# Patient Record
Sex: FEMALE | Race: WHITE | Employment: FULL TIME | URBAN - METROPOLITAN AREA
[De-identification: names, ages, dates, MRNs, and addresses within clinical notes are randomized per-mention and may not be internally consistent; named-entity substitution may affect disease eponyms.]

---

## 2017-04-03 ENCOUNTER — APPOINTMENT (OUTPATIENT)
Dept: CT IMAGING | Facility: CLINIC | Age: 52
End: 2017-04-03
Attending: EMERGENCY MEDICINE
Payer: COMMERCIAL

## 2017-04-03 ENCOUNTER — HOSPITAL ENCOUNTER (EMERGENCY)
Facility: CLINIC | Age: 52
Discharge: HOME | End: 2017-04-04
Attending: EMERGENCY MEDICINE | Admitting: EMERGENCY MEDICINE
Payer: COMMERCIAL

## 2017-04-03 ENCOUNTER — OFFICE VISIT (OUTPATIENT)
Dept: URGENT CARE | Facility: URGENT CARE | Age: 52
End: 2017-04-03
Payer: COMMERCIAL

## 2017-04-03 VITALS
BODY MASS INDEX: 32.25 KG/M2 | HEART RATE: 80 BPM | WEIGHT: 182 LBS | TEMPERATURE: 98.3 F | OXYGEN SATURATION: 96 % | HEIGHT: 63 IN | DIASTOLIC BLOOD PRESSURE: 61 MMHG | SYSTOLIC BLOOD PRESSURE: 98 MMHG

## 2017-04-03 DIAGNOSIS — R10.31 ABDOMINAL PAIN, RIGHT LOWER QUADRANT: Primary | ICD-10-CM

## 2017-04-03 DIAGNOSIS — N10 ACUTE PYELONEPHRITIS: ICD-10-CM

## 2017-04-03 LAB
ALBUMIN SERPL-MCNC: 3.4 G/DL (ref 3.4–5)
ALBUMIN UR-MCNC: 30 MG/DL
ALP SERPL-CCNC: 100 U/L (ref 40–150)
ALT SERPL W P-5'-P-CCNC: 39 U/L (ref 0–50)
ANION GAP SERPL CALCULATED.3IONS-SCNC: 8 MMOL/L (ref 3–14)
APPEARANCE UR: ABNORMAL
AST SERPL W P-5'-P-CCNC: 36 U/L (ref 0–45)
BACTERIA #/AREA URNS HPF: ABNORMAL /HPF
BASOPHILS # BLD AUTO: 0 10E9/L (ref 0–0.2)
BASOPHILS NFR BLD AUTO: 0.2 %
BILIRUB SERPL-MCNC: 0.5 MG/DL (ref 0.2–1.3)
BILIRUB UR QL STRIP: NEGATIVE
BUN SERPL-MCNC: 13 MG/DL (ref 7–30)
CALCIUM SERPL-MCNC: 8.7 MG/DL (ref 8.5–10.1)
CHLORIDE SERPL-SCNC: 105 MMOL/L (ref 94–109)
CO2 SERPL-SCNC: 28 MMOL/L (ref 20–32)
COLOR UR AUTO: YELLOW
CREAT SERPL-MCNC: 0.81 MG/DL (ref 0.52–1.04)
DIFFERENTIAL METHOD BLD: ABNORMAL
EOSINOPHIL # BLD AUTO: 0.2 10E9/L (ref 0–0.7)
EOSINOPHIL NFR BLD AUTO: 1.9 %
ERYTHROCYTE [DISTWIDTH] IN BLOOD BY AUTOMATED COUNT: 14.4 % (ref 10–15)
GFR SERPL CREATININE-BSD FRML MDRD: 74 ML/MIN/1.7M2
GLUCOSE SERPL-MCNC: 91 MG/DL (ref 70–99)
GLUCOSE UR STRIP-MCNC: NEGATIVE MG/DL
HCT VFR BLD AUTO: 36.8 % (ref 35–47)
HGB BLD-MCNC: 12 G/DL (ref 11.7–15.7)
HGB UR QL STRIP: ABNORMAL
IMM GRANULOCYTES # BLD: 0 10E9/L (ref 0–0.4)
IMM GRANULOCYTES NFR BLD: 0.2 %
KETONES UR STRIP-MCNC: NEGATIVE MG/DL
LEUKOCYTE ESTERASE UR QL STRIP: ABNORMAL
LIPASE SERPL-CCNC: 131 U/L (ref 73–393)
LYMPHOCYTES # BLD AUTO: 1.8 10E9/L (ref 0.8–5.3)
LYMPHOCYTES NFR BLD AUTO: 14.8 %
MCH RBC QN AUTO: 29 PG (ref 26.5–33)
MCHC RBC AUTO-ENTMCNC: 32.6 G/DL (ref 31.5–36.5)
MCV RBC AUTO: 89 FL (ref 78–100)
MONOCYTES # BLD AUTO: 1.1 10E9/L (ref 0–1.3)
MONOCYTES NFR BLD AUTO: 9.1 %
NEUTROPHILS # BLD AUTO: 8.9 10E9/L (ref 1.6–8.3)
NEUTROPHILS NFR BLD AUTO: 73.8 %
NITRATE UR QL: NEGATIVE
NON-SQ EPI CELLS #/AREA URNS LPF: ABNORMAL /LPF
NRBC # BLD AUTO: 0 10*3/UL
NRBC BLD AUTO-RTO: 0 /100
PH UR STRIP: 5.5 PH (ref 5–7)
PLATELET # BLD AUTO: 254 10E9/L (ref 150–450)
POTASSIUM SERPL-SCNC: 3.6 MMOL/L (ref 3.4–5.3)
PROT SERPL-MCNC: 7.9 G/DL (ref 6.8–8.8)
RBC # BLD AUTO: 4.14 10E12/L (ref 3.8–5.2)
RBC #/AREA URNS AUTO: ABNORMAL /HPF (ref 0–2)
SODIUM SERPL-SCNC: 141 MMOL/L (ref 133–144)
SP GR UR STRIP: 1.02 (ref 1–1.03)
URN SPEC COLLECT METH UR: ABNORMAL
UROBILINOGEN UR STRIP-ACNC: 0.2 EU/DL (ref 0.2–1)
WBC # BLD AUTO: 12.1 10E9/L (ref 4–11)
WBC #/AREA URNS AUTO: ABNORMAL /HPF (ref 0–2)

## 2017-04-03 PROCEDURE — 81001 URINALYSIS AUTO W/SCOPE: CPT | Performed by: EMERGENCY MEDICINE

## 2017-04-03 PROCEDURE — 87086 URINE CULTURE/COLONY COUNT: CPT | Performed by: EMERGENCY MEDICINE

## 2017-04-03 PROCEDURE — 80053 COMPREHEN METABOLIC PANEL: CPT | Performed by: EMERGENCY MEDICINE

## 2017-04-03 PROCEDURE — 85025 COMPLETE CBC W/AUTO DIFF WBC: CPT | Performed by: EMERGENCY MEDICINE

## 2017-04-03 PROCEDURE — 25500064 ZZH RX 255 OP 636: Performed by: EMERGENCY MEDICINE

## 2017-04-03 PROCEDURE — 96374 THER/PROPH/DIAG INJ IV PUSH: CPT | Mod: 59

## 2017-04-03 PROCEDURE — 25000125 ZZHC RX 250: Performed by: EMERGENCY MEDICINE

## 2017-04-03 PROCEDURE — 96361 HYDRATE IV INFUSION ADD-ON: CPT

## 2017-04-03 PROCEDURE — 74177 CT ABD & PELVIS W/CONTRAST: CPT

## 2017-04-03 PROCEDURE — 25000128 H RX IP 250 OP 636: Performed by: EMERGENCY MEDICINE

## 2017-04-03 PROCEDURE — 87186 SC STD MICRODIL/AGAR DIL: CPT | Performed by: EMERGENCY MEDICINE

## 2017-04-03 PROCEDURE — 83690 ASSAY OF LIPASE: CPT | Performed by: EMERGENCY MEDICINE

## 2017-04-03 PROCEDURE — 99285 EMERGENCY DEPT VISIT HI MDM: CPT | Mod: 25

## 2017-04-03 PROCEDURE — 99207 ZZC OFFICE-HOSPITAL ADMIT: CPT | Performed by: NURSE PRACTITIONER

## 2017-04-03 PROCEDURE — 87088 URINE BACTERIA CULTURE: CPT | Performed by: EMERGENCY MEDICINE

## 2017-04-03 RX ORDER — IOPAMIDOL 755 MG/ML
92 INJECTION, SOLUTION INTRAVASCULAR ONCE
Status: COMPLETED | OUTPATIENT
Start: 2017-04-03 | End: 2017-04-03

## 2017-04-03 RX ORDER — SODIUM CHLORIDE 9 MG/ML
1000 INJECTION, SOLUTION INTRAVENOUS CONTINUOUS
Status: DISCONTINUED | OUTPATIENT
Start: 2017-04-03 | End: 2017-04-04 | Stop reason: HOSPADM

## 2017-04-03 RX ADMIN — SODIUM CHLORIDE 68 ML: 9 INJECTION, SOLUTION INTRAVENOUS at 23:03

## 2017-04-03 RX ADMIN — IOPAMIDOL 92 ML: 755 INJECTION, SOLUTION INTRAVENOUS at 23:02

## 2017-04-03 RX ADMIN — SODIUM CHLORIDE 1000 ML: 9 INJECTION, SOLUTION INTRAVENOUS at 22:41

## 2017-04-03 NOTE — ED AVS SNAPSHOT
Emergency Department    [address] [facility] [address]    Phone:  [phone]    Fax:  [phone]                                       Mary Anne Tavarez   MRN: [id number]    Department:   Emergency Department   Date of Visit:  4/3/2017           Patient Information     Date Of Birth          1965        Your diagnoses for this visit were:     Acute pyelonephritis        You were seen by Bindu Ashley MD.      Follow-up Information     Follow up with your primary doctor. Schedule an appointment as soon as possible for a visit in 2 days.        Follow up with  Emergency Department.    Specialty:  EMERGENCY MEDICINE    Why:  As needed, If symptoms worsen    Contact information:    [address] [facility] [address] [phone]        Discharge Instructions       Discharge Instructions  Urinary Tract Infection  You have urinary tract infection, or UTI. The urinary tract includes the kidneys (which make urine), ureters (the tubes that carry urine from the kidneys to the bladder), the bladder (which stores urine), and urethra (the tube that carries urine out of the bladder).  Urinary tract infections occur when bacteria travel up the urethra into the bladder. We suspect a UTI based on chemical and microscopic findings in your urine, but if there is a question about your findings, we will do a culture to see if bacteria grow. A urine culture takes several days. You should always follow-up with your primary physician to find out about results of your culture if one was done.   Return to the Emergency Department if:    You have severe back pain.    You are vomiting so that you can t take your medicine, or have signs of dehydration (such as urinating less than 3 times per day).    You have fever over 101.5 degrees F.    You have significant confusion or are very weak, or feel very ill.    Your child seems much more ill, won t wake up, won t respond right, or is crying for a  long time and won t calm down.    Your child is showing signs of dehydration, Signs of dehydration can be:  o Your infant has had no wet diapers in 4-5 hours.  o Your older child has not passed urine in 6-8 hours.  o Your infant or child starts to have dry mouth and lips, or no saliva or tears.    Follow-up with your doctor:     Children under 24 months need to be seen by their regular doctor within one week after a diagnosis of a UTI. It may be necessary to do some imaging tests to look at the child s kidney or bladder.    You should begin to feel better within 24 - 48 hours of starting your antibiotic.  If you do not, you need to be seen again.      Treatment:     You will be treated with an antibiotic to kill the bacteria. We have to make an educated guess as to which antibiotic will work for your infection. In most healthy people, we can guess right almost all of the time. Sometimes a culture is done to show which antibiotics will work. This usually takes 2-3 days. When the culture is done, we may have to contact you to put you on a different antibiotic.    Take a pain medication such as Tylenol  (acetaminophen), Advil  (ibuprofen), Nuprin  (ibuprofen), or Aleve  (naproxen). If you have been given a narcotic such as Vicodin  (hydrocodone with acetaminophen), Percocet  (oxycodone with acetaminophen), or codeine, do not drive for four hours after you have taken it. If the narcotic contains Tylenol  (acetaminophen), do not take Tylenol  with it. All narcotics will cause constipation, so eat a high fiber diet.      Pyridium  (phenazopyridine) or Uristat  (phenazopyridine) is a prescription medication that numbs the bladder to reduce the burning pain of some UTIs.  The same medication is available in a non-prescription version called Azo-Standard  (phenazopyridine), Urodol  (phenazopyridine), or other brand names. This medication will change the color of the urine and tears (usually blue or orange). If you wear  "contacts, do not wear them while taking this medication as they may be stained by the medication.    Antibiotic Warning:     If you have been placed on antibiotics - watch for signs of allergic reaction.  These include rash, lip swelling, difficulty breathing, wheezing, and dizziness.  If you develop any of these symptoms, stop the antibiotic immediately and go to an emergency room or urgent care for evaluation.    Probiotics: If you have been given an antibiotic, you may want to also take a probiotic pill or eat yogurt with live cultures. Probiotics have \"good bacteria\" to help your intestines stay healthy. Studies have shown that probiotics help prevent diarrhea and other intestine problems (including C. diff infection) when you take antibiotics. You can buy these without a prescription in the pharmacy section of the store.   If you were given a prescription for medicine here today, be sure to read all of the information (including the package insert) that comes with your prescription.  This will include important information about the medicine, its side effects, and any warnings that you need to know about.  The pharmacist who fills the prescription can provide more information and answer questions you may have about the medicine.  If you have questions or concerns that the pharmacist cannot address, please call or return to the Emergency Department.   Opioid Medication Information    Pain medications are among the most commonly prescribed medicines, so we are including this information for all our patients. If you did not receive pain medication or get a prescription for pain medicine, you can ignore it.     You may have been given a prescription for an opioid (narcotic) pain medicine and/or have received a pain medicine while here in the Emergency Department. These medicines can make you drowsy or impaired. You must not drive, operate dangerous equipment, or engage in any other dangerous activities while taking " these medications. If you drive while taking these medications, you could be arrested for DUI, or driving under the influence. Do not drink any alcohol while you are taking these medications.     Opioid pain medications can cause addiction. If you have a history of chemical dependency of any type, you are at a higher risk of becoming addicted to pain medications.  Only take these prescribed medications to treat your pain when all other options have been tried. Take it for as short a time and as few doses as possible. Store your pain pills in a secure place, as they are frequently stolen and provide a dangerous opportunity for children or visitors in your house to start abusing these powerful medications. We will not replace any lost or stolen medicine.  As soon as your pain is better, you should flush all your remaining medication.     Many prescription pain medications contain Tylenol  (acetaminophen), including Vicodin , Tylenol #3 , Norco , Lortab , and Percocet .  You should not take any extra pills of Tylenol  if you are using these prescription medications or you can get very sick.  Do not ever take more than 3000 mg of acetaminophen in any 24 hour period.    All opioids tend to cause constipation. Drink plenty of water and eat foods that have a lot of fiber, such as fruits, vegetables, prune juice, apple juice and high fiber cereal.  Take a laxative if you don t move your bowels at least every other day. Miralax , Milk of Magnesia, Colace , or Senna  can be used to keep you regular.      Remember that you can always come back to the Emergency Department if you are not able to see your regular doctor in the amount of time listed above, if you get any new symptoms, or if there is anything that worries you.        24 Hour Appointment Hotline       To make an appointment at any [facility] clinic, call [phone] ([phone]). If you don't have a family doctor or clinic, we will help you find one. [facility]  clinics are conveniently located to serve the needs of you and your family.             Review of your medicines      START taking        Dose / Directions Last dose taken    ciprofloxacin 500 MG tablet   Commonly known as:  CIPRO   Dose:  500 mg   Quantity:  14 tablet        Take 1 tablet (500 mg) by mouth 2 times daily for 7 days   Refills:  0          Our records show that you are taking the medicines listed below. If these are incorrect, please call your family doctor or clinic.        Dose / Directions Last dose taken    ALEVE PO        Refills:  0        OMEPRAZOLE PO   Dose:  20 mg        Take 20 mg by mouth   Refills:  0                Prescriptions were sent or printed at these locations (1 Prescription)                   Other Prescriptions                Printed at Department/Unit printer (1 of 1)         ciprofloxacin (CIPRO) 500 MG tablet                Procedures and tests performed during your visit     Abdomen US, limited (RUQ only)    CBC with platelets differential    CT Abdomen Pelvis w Contrast    Comprehensive metabolic panel    Lipase    UA reflex to Microscopic and Culture    UA with Microscopic    Urine Culture    Urine Culture Aerobic Bacterial    Urine Microscopic      Orders Needing Specimen Collection     None      Pending Results     Date and Time Order Name Status Description    4/3/2017 2342 Urine Culture In process     4/3/2017 2342 Abdomen US, limited (RUQ only) Preliminary     4/3/2017 2237 Urine Culture Aerobic Bacterial In process     4/3/2017 2227 CT Abdomen Pelvis w Contrast Preliminary             Pending Culture Results     Date and Time Order Name Status Description    4/3/2017 2342 Urine Culture In process     4/3/2017 2237 Urine Culture Aerobic Bacterial In process              Test Results from your hospital stay     4/3/2017 10:55 PM - Interface, Flexilab Results      Component Results     Component Value Ref Range & Units Status    WBC 12.1 (H) 4.0 - 11.0 10e9/L Final     RBC Count 4.14 3.8 - 5.2 10e12/L Final    Hemoglobin 12.0 11.7 - 15.7 g/dL Final    Hematocrit 36.8 35.0 - 47.0 % Final    MCV 89 78 - 100 fl Final    MCH 29.0 26.5 - 33.0 pg Final    MCHC 32.6 31.5 - 36.5 g/dL Final    RDW 14.4 10.0 - 15.0 % Final    Platelet Count 254 150 - 450 10e9/L Final    Diff Method Automated Method  Final    % Neutrophils 73.8 % Final    % Lymphocytes 14.8 % Final    % Monocytes 9.1 % Final    % Eosinophils 1.9 % Final    % Basophils 0.2 % Final    % Immature Granulocytes 0.2 % Final    Nucleated RBCs 0 0 /100 Final    Absolute Neutrophil 8.9 (H) 1.6 - 8.3 10e9/L Final    Absolute Lymphocytes 1.8 0.8 - 5.3 10e9/L Final    Absolute Monocytes 1.1 0.0 - 1.3 10e9/L Final    Absolute Eosinophils 0.2 0.0 - 0.7 10e9/L Final    Absolute Basophils 0.0 0.0 - 0.2 10e9/L Final    Abs Immature Granulocytes 0.0 0 - 0.4 10e9/L Final    Absolute Nucleated RBC 0.0  Final         4/3/2017 11:22 PM - Interface, Flexilab Results      Component Results     Component Value Ref Range & Units Status    Sodium 141 133 - 144 mmol/L Final    Potassium 3.6 3.4 - 5.3 mmol/L Final    Chloride 105 94 - 109 mmol/L Final    Carbon Dioxide 28 20 - 32 mmol/L Final    Anion Gap 8 3 - 14 mmol/L Final    Glucose 91 70 - 99 mg/dL Final    Urea Nitrogen 13 7 - 30 mg/dL Final    Creatinine 0.81 0.52 - 1.04 mg/dL Final    GFR Estimate 74 >60 mL/min/1.7m2 Final    Non  GFR Calc    GFR Estimate If Black 89 >60 mL/min/1.7m2 Final    African American GFR Calc    Calcium 8.7 8.5 - 10.1 mg/dL Final    Bilirubin Total 0.5 0.2 - 1.3 mg/dL Final    Albumin 3.4 3.4 - 5.0 g/dL Final    Protein Total 7.9 6.8 - 8.8 g/dL Final    Alkaline Phosphatase 100 40 - 150 U/L Final    ALT 39 0 - 50 U/L Final    AST 36 0 - 45 U/L Final         4/3/2017 11:20 PM - Interface, Flexilab Results      Component Results     Component Value Ref Range & Units Status    Lipase 131 73 - 393 U/L Final         4/3/2017 11:41 PM - Interface, Radiant Ib       Narrative     CT ABDOMEN PELVIS W CONTRAST  4/3/2017 11:06 PM      HISTORY: Right lower quadrant pain.    TECHNIQUE: CT abdomen and pelvis with intravenous contrast. Radiation  dose for this scan was reduced using automated exposure control,  adjustment of the mA and/or kV according to patient size, or iterative  reconstruction technique. 92 mL Isovue-370.     COMPARISON: None.    FINDINGS:  Abdomen: There is mild dependent atelectasis at the lung bases. The  heart size is normal. Bilobar breast prostheses. The liver, spleen,  gallbladder, pancreas, adrenal glands and left kidney are normal in  appearance. There are subtle areas of decreased enhancement in the mid  and lower pole of the right kidney. Mild inflammation of the adjacent  fat. Subtle renal mass is not completely ruled out. There is no  abdominal or pelvic lymph node enlargement.    Pelvis: The uterus and adnexa appear normal. There is no bowel  obstruction or inflammation. The appendix is normal. Small amount of  free fluid in the pelvis. No free intraperitoneal gas.        Impression     IMPRESSION:  1. Heterogeneous enhancement in the mid and lower pole of the right  kidney with mild surrounding inflammation. This may be acute  pyelonephritis. Subtle renal mass is not completely ruled out and  follow-up is recommended.  2. No appendicitis or other bowel inflammation or obstruction.  3. Small amount of free fluid in the pelvis, within physiologic  limits.         4/3/2017 10:55 PM - Interface, Flexilab Results      Component Results     Component Value Ref Range & Units Status    Color Urine Yellow  Final    Appearance Urine Cloudy  Final    Glucose Urine Negative NEG mg/dL Final    Bilirubin Urine Negative NEG Final    Ketones Urine Negative NEG mg/dL Final    Specific Gravity Urine 1.025 1.003 - 1.035 Final    Blood Urine Large (A) NEG Final    pH Urine 5.5 5.0 - 7.0 pH Final    Protein Albumin Urine 30 (A) NEG mg/dL Final    Urobilinogen Urine  0.2 0.2 - 1.0 EU/dL Final    Nitrite Urine Negative NEG Final    Leukocyte Esterase Urine Moderate (A) NEG Final    Source Midstream Urine  Final         4/3/2017 10:55 PM - Interface, Flexilab Results      Component Results     Component Value Ref Range & Units Status    WBC Urine 25-50 (A) 0 - 2 /HPF Final    RBC Urine 2-5 (A) 0 - 2 /HPF Final    Squamous Epithelial /LPF Urine Many (A) FEW /LPF Final    Bacteria Urine Moderate (A) NEG /HPF Final         4/3/2017 10:56 PM - Interface, Flexilab Results         4/4/2017 12:51 AM - Interface, Radiant Ib      Narrative     US ABDOMEN LIMITED  4/4/2017 12:43 AM      HISTORY: Pain.    COMPARISON: CT 4/3/2017.    FINDINGS: The liver is normal in size and texture without focal mass.  There is no intra or extrahepatic biliary dilatation. The common  hepatic duct measures 0.3 cm.  The gallbladder is normal in appearance  without gallstones.  The pancreas head and body appear normal. The  tail is obscured by bowel gas.  The right kidney measures 12.6 cm.  There is heterogeneous appearance of the right kidney with an  ill-defined mass or lobulation in the midkidney measuring  approximately 4.1 x 2.0 x 2.0 cm. No hydronephrosis.        Impression     IMPRESSION:  1. Heterogeneous appearance of the mid right kidney. This could be a  mass or prominent cortical lobule. No hydronephrosis.  2. No gallstone or biliary dilatation.         4/4/2017 12:15 AM - Interface, Flexilab Results      Component Results     Component Value Ref Range & Units Status    Color Urine Straw  Final    Appearance Urine Clear  Final    Glucose Urine Negative NEG mg/dL Final    Bilirubin Urine Negative NEG Final    Ketones Urine Negative NEG mg/dL Final    Specific Gravity Urine 1.012 1.003 - 1.035 Final    Blood Urine Moderate (A) NEG Final    pH Urine 5.5 5.0 - 7.0 pH Final    Protein Albumin Urine Negative NEG mg/dL Final    Urobilinogen mg/dL Normal 0.0 - 2.0 mg/dL Final    Nitrite Urine Negative NEG  Final    Leukocyte Esterase Urine Moderate (A) NEG Final    Source Midstream Urine  Final    WBC Urine 10 (H) 0 - 2 /HPF Final    RBC Urine <1 0 - 2 /HPF Final    Bacteria Urine Few (A) NEG /HPF Final    Squamous Epithelial /HPF Urine 1 0 - 1 /HPF Final         4/4/2017 12:08 AM - Interface, Angelab Results                Clinical Quality Measure: Blood Pressure Screening     Your blood pressure was checked while you were in the emergency department today. The last reading we obtained was  BP: 120/52 . Please read the guidelines below about what these numbers mean and what you should do about them.  If your systolic blood pressure (the top number) is less than 120 and your diastolic blood pressure (the bottom number) is less than 80, then your blood pressure is normal. There is nothing more that you need to do about it.  If your systolic blood pressure (the top number) is 120-139 or your diastolic blood pressure (the bottom number) is 80-89, your blood pressure may be higher than it should be. You should have your blood pressure rechecked within a year by a primary care provider.  If your systolic blood pressure (the top number) is 140 or greater or your diastolic blood pressure (the bottom number) is 90 or greater, you may have high blood pressure. High blood pressure is treatable, but if left untreated over time it can put you at risk for heart attack, stroke, or kidney failure. You should have your blood pressure rechecked by a primary care provider within the next 4 weeks.  If your provider in the emergency department today gave you specific instructions to follow-up with your doctor or provider even sooner than that, you should follow that instruction and not wait for up to 4 weeks for your follow-up visit.        Thank you for choosing [facility]       Thank you for choosing [facility] for your care. Our goal is always to provide you with excellent care. Hearing back from our patients is one way we can continue  "to improve our services. Please take a few minutes to complete the written survey that you may receive in the mail after you visit with us. Thank you!        [employer] Information     [employer] lets you send messages to your doctor, view your test results, renew your prescriptions, schedule appointments and more. To sign up, go to [email or web address] . Click on \"Log in\" on the left side of the screen, which will take you to the Welcome page. Then click on \"Sign up Now\" on the right side of the page.     You will be asked to enter the access code listed below, as well as some personal information. Please follow the directions to create your username and password.     Your access code is: [email or web address]  Expires: 2017  9:04 PM     Your access code will  in 90 days. If you need help or a new code, please call your [facility] clinic or [phone].        Care EveryWhere ID     This is your Care EveryWhere ID. This could be used by other organizations to access your [facility] medical records  [id number]        After Visit Summary       This is your record. Keep this with you and show to your community pharmacist(s) and doctor(s) at your next visit.                  "

## 2017-04-03 NOTE — ED AVS SNAPSHOT
Emergency Department    [address] [place] [address]    Phone:  [phone]    Fax:  [phone]                                       Mary Anne Tavarez   MRN: [id number]    Department:   Emergency Department   Date of Visit:  4/3/2017           After Visit Summary Signature Page     I have received my discharge instructions, and my questions have been answered. I have discussed any challenges I see with this plan with the nurse or doctor.    ..........................................................................................................................................  Patient/Patient Representative Signature      ..........................................................................................................................................  Patient Representative Print Name and Relationship to Patient    ..................................................               ................................................  Date                                            Time    ..........................................................................................................................................  Reviewed by Signature/Title    ...................................................              ..............................................  Date                                                            Time

## 2017-04-03 NOTE — MR AVS SNAPSHOT
After Visit Summary   4/3/2017    Mary Anne Tavarez    MRN: [id number]           Patient Information     Date Of Birth          1965        Visit Information        Provider Department      4/3/2017 8:20 PM Tanika Glez APRN [facility]        Today's Diagnoses     Abdominal pain, right lower quadrant    -  1      Care Instructions      Abdominal Pain  Abdominal pain is pain in the stomach or intestinal area. Everyone has this pain from time to time. In many cases it goes away on its own. But abdominal pain can sometimes be due to a serious problem, such as appendicitis. So it s important to know when to seek help.  Causes of abdominal pain  There are many possible causes of abdominal pain. Common causes in adults include:    Constipation, diarrhea, or gas    GERD (gastroesophageal reflux disease) movement of stomach acid into the esophagus, also known as acid reflux or heartburn    Peptic ulcer (a sore in the lining of the stomach or small intestine)    Inflammation of the gallbladder, liver, or pancreas    Gallstones or kidney stones    Appendicitis     Obstruction of the intestines     Hernia (bulging of an internal organ through a muscle or other tissue)    Urinary tract infections    In women, menstrual cramps, fibroids, or endometriosis of the uterus    Inflammation or infection of the intestines  Diagnosing the cause of abdominal pain  Your health care provider will examine you to help find the cause of your pain. If needed, tests will be ordered. Because abdominal pain has so many possible causes, it can be hard to discover the reason for the pain. Giving details about your pain can help. Be ready to tell your health care provider where and when you feel the pain and what makes it better or worse. Also mention whether you have other symptoms such as fever, tiredness, nausea, vomiting, or changes in bathroom habits.  Treating abdominal pain  Certain causes  of pain, such as appendicitis or a bowel obstruction, need emergency treatment. Other problems can be treated with rest, fluids, or medications. Your health care provider can give you specific instructions for treatment or self-care based on the cause of your pain.  If you have vomiting or diarrhea, sip water or other clear fluids. When you are ready to eat solid foods again, start with small amounts of easy-to-digest, low-fat foods, such as applesauce, toast, or crackers.   When to call the doctor  Call 911 or go to the hospital right away if you:    Can t pass stool and are vomiting    Are vomiting blood or have black, tarry diarrhea    Also have chest, neck, or shoulder pain    Feel like you are about to pass out    Have pain in your shoulder blades with nausea    Have sudden, excruciating abdominal pain    Have new, severe pain unlike any you have felt before    Have a belly that is rigid, hard, and tender to touch  Call your doctor if you have:    Pain for more than 5 days    Bloating for more than 2 days    Diarrhea for more than 5 days    Fever of 101 F (38.3 C) or higher    Pain that continues to worsen    Unexplained weight loss    Continued lack of appetite    Blood in the stool  How to prevent abdominal pain  Here are some tips to help prevent abdominal pain:    Eat smaller amounts of food at one time.    Avoid greasy, fried, or other high-fat foods.    Avoid foods that give you gas.    Exercise regularly.    Drink plenty of fluids.  To help prevent symptoms of gastroesophageal reflux disease (GERD):    Quit smoking.    Reduce alcohol and certain foods that increase stomach acid.     Lose excess weight.    Finish eating at least 2 hours before you go to bed or lie down.    Elevate the head of your bed.    9351-7174 [employer]. [address] [place] [address], [place] [address]. All rights reserved. This information is not intended as a substitute for professional medical care. Always follow your  "healthcare professional's instructions.              Follow-ups after your visit        Follow-up notes from your care team     Return if symptoms worsen or fail to improve.      Who to contact     If you have questions or need follow up information about today's clinic visit or your schedule please contact [facility] directly at [phone].  Normal or non-critical lab and imaging results will be communicated to you by MyChart, letter or phone within 4 business days after the clinic has received the results. If you do not hear from us within 7 days, please contact the clinic through [employer] or phone. If you have a critical or abnormal lab result, we will notify you by phone as soon as possible.  Submit refill requests through [employer] or call your pharmacy and they will forward the refill request to us. Please allow 3 business days for your refill to be completed.          Additional Information About Your Visit        MyChart Information     [employer] lets you send messages to your doctor, view your test results, renew your prescriptions, schedule appointments and more. To sign up, go to [email or web address] . Click on \"Log in\" on the left side of the screen, which will take you to the Welcome page. Then click on \"Sign up Now\" on the right side of the page.     You will be asked to enter the access code listed below, as well as some personal information. Please follow the directions to create your username and password.     Your access code is: [email or web address]  Expires: 2017  9:04 PM     Your access code will  in 90 days. If you need help or a new code, please call your [facility] clinic or [phone].        Care EveryWhere ID     This is your Care EveryWhere ID. This could be used by other organizations to access your [facility] medical records  [id number]        Your Vitals Were     Pulse Temperature Height Pulse Oximetry BMI (Body Mass Index)       80 98.3  F (36.8  C) (Oral) 5' " "3\" (1.6 m) 96% 32.24 kg/m2        Blood Pressure from Last 3 Encounters:   04/03/17 98/61    Weight from Last 3 Encounters:   04/03/17 182 lb (82.6 kg)              Today, you had the following     No orders found for display       Primary Care Provider    None Specified       No primary provider on file.        Thank you!     Thank you for choosing [facility]  for your care. Our goal is always to provide you with excellent care. Hearing back from our patients is one way we can continue to improve our services. Please take a few minutes to complete the written survey that you may receive in the mail after your visit with us. Thank you!             Your Updated Medication List - Protect others around you: Learn how to safely use, store and throw away your medicines at www.disposemymeds.org.          This list is accurate as of: 4/3/17  9:06 PM.  Always use your most recent med list.                   Brand Name Dispense Instructions for use    ALEVE PO            "

## 2017-04-04 ENCOUNTER — APPOINTMENT (OUTPATIENT)
Dept: ULTRASOUND IMAGING | Facility: CLINIC | Age: 52
End: 2017-04-04
Attending: EMERGENCY MEDICINE
Payer: COMMERCIAL

## 2017-04-04 ENCOUNTER — TELEPHONE (OUTPATIENT)
Dept: NURSING | Facility: CLINIC | Age: 52
End: 2017-04-04

## 2017-04-04 ENCOUNTER — HOSPITAL ENCOUNTER (OUTPATIENT)
Facility: CLINIC | Age: 52
Setting detail: OBSERVATION
Discharge: HOME | End: 2017-04-05
Attending: EMERGENCY MEDICINE | Admitting: INTERNAL MEDICINE
Payer: COMMERCIAL

## 2017-04-04 VITALS
HEIGHT: 63 IN | TEMPERATURE: 98.2 F | HEART RATE: 80 BPM | OXYGEN SATURATION: 96 % | RESPIRATION RATE: 16 BRPM | SYSTOLIC BLOOD PRESSURE: 107 MMHG | DIASTOLIC BLOOD PRESSURE: 53 MMHG | BODY MASS INDEX: 32.25 KG/M2 | WEIGHT: 182 LBS

## 2017-04-04 DIAGNOSIS — N10 ACUTE PYELONEPHRITIS: ICD-10-CM

## 2017-04-04 PROBLEM — N12 PYELONEPHRITIS: Status: ACTIVE | Noted: 2017-04-04

## 2017-04-04 LAB
ALBUMIN UR-MCNC: NEGATIVE MG/DL
ANION GAP SERPL CALCULATED.3IONS-SCNC: 10 MMOL/L (ref 3–14)
APPEARANCE UR: CLEAR
BACTERIA #/AREA URNS HPF: ABNORMAL /HPF
BASOPHILS # BLD AUTO: 0 10E9/L (ref 0–0.2)
BASOPHILS NFR BLD AUTO: 0.3 %
BILIRUB UR QL STRIP: NEGATIVE
BUN SERPL-MCNC: 11 MG/DL (ref 7–30)
CALCIUM SERPL-MCNC: 8.4 MG/DL (ref 8.5–10.1)
CHLORIDE SERPL-SCNC: 104 MMOL/L (ref 94–109)
CO2 BLDCOV-SCNC: 25 MMOL/L (ref 21–28)
CO2 SERPL-SCNC: 26 MMOL/L (ref 20–32)
COLOR UR AUTO: ABNORMAL
CREAT SERPL-MCNC: 0.74 MG/DL (ref 0.52–1.04)
DIFFERENTIAL METHOD BLD: ABNORMAL
EOSINOPHIL # BLD AUTO: 0.2 10E9/L (ref 0–0.7)
EOSINOPHIL NFR BLD AUTO: 1.8 %
ERYTHROCYTE [DISTWIDTH] IN BLOOD BY AUTOMATED COUNT: 14.2 % (ref 10–15)
GFR SERPL CREATININE-BSD FRML MDRD: 82 ML/MIN/1.7M2
GLUCOSE SERPL-MCNC: 96 MG/DL (ref 70–99)
GLUCOSE UR STRIP-MCNC: NEGATIVE MG/DL
HCT VFR BLD AUTO: 33.7 % (ref 35–47)
HGB BLD-MCNC: 10.9 G/DL (ref 11.7–15.7)
HGB UR QL STRIP: ABNORMAL
IMM GRANULOCYTES # BLD: 0 10E9/L (ref 0–0.4)
IMM GRANULOCYTES NFR BLD: 0.2 %
KETONES UR STRIP-MCNC: NEGATIVE MG/DL
LACTATE BLD-SCNC: 0.6 MMOL/L (ref 0.7–2.1)
LEUKOCYTE ESTERASE UR QL STRIP: ABNORMAL
LYMPHOCYTES # BLD AUTO: 1.7 10E9/L (ref 0.8–5.3)
LYMPHOCYTES NFR BLD AUTO: 14.3 %
MCH RBC QN AUTO: 28.5 PG (ref 26.5–33)
MCHC RBC AUTO-ENTMCNC: 32.3 G/DL (ref 31.5–36.5)
MCV RBC AUTO: 88 FL (ref 78–100)
MONOCYTES # BLD AUTO: 1.6 10E9/L (ref 0–1.3)
MONOCYTES NFR BLD AUTO: 13.3 %
NEUTROPHILS # BLD AUTO: 8.4 10E9/L (ref 1.6–8.3)
NEUTROPHILS NFR BLD AUTO: 70.1 %
NITRATE UR QL: NEGATIVE
NRBC # BLD AUTO: 0 10*3/UL
NRBC BLD AUTO-RTO: 0 /100
PCO2 BLDV: 43 MM HG (ref 40–50)
PH BLDV: 7.38 PH (ref 7.32–7.43)
PH UR STRIP: 5.5 PH (ref 5–7)
PLATELET # BLD AUTO: 262 10E9/L (ref 150–450)
PO2 BLDV: 38 MM HG (ref 25–47)
POTASSIUM SERPL-SCNC: 3.8 MMOL/L (ref 3.4–5.3)
RBC # BLD AUTO: 3.83 10E12/L (ref 3.8–5.2)
RBC #/AREA URNS AUTO: <1 /HPF (ref 0–2)
SAO2 % BLDV FROM PO2: 71 %
SODIUM SERPL-SCNC: 140 MMOL/L (ref 133–144)
SP GR UR STRIP: 1.01 (ref 1–1.03)
SQUAMOUS #/AREA URNS AUTO: 1 /HPF (ref 0–1)
URN SPEC COLLECT METH UR: ABNORMAL
UROBILINOGEN UR STRIP-MCNC: NORMAL MG/DL (ref 0–2)
WBC # BLD AUTO: 12 10E9/L (ref 4–11)
WBC #/AREA URNS AUTO: 10 /HPF (ref 0–2)

## 2017-04-04 PROCEDURE — G0378 HOSPITAL OBSERVATION PER HR: HCPCS

## 2017-04-04 PROCEDURE — 25000132 ZZH RX MED GY IP 250 OP 250 PS 637: Performed by: INTERNAL MEDICINE

## 2017-04-04 PROCEDURE — 25000128 H RX IP 250 OP 636: Performed by: EMERGENCY MEDICINE

## 2017-04-04 PROCEDURE — 80048 BASIC METABOLIC PNL TOTAL CA: CPT | Performed by: EMERGENCY MEDICINE

## 2017-04-04 PROCEDURE — 83605 ASSAY OF LACTIC ACID: CPT

## 2017-04-04 PROCEDURE — 87088 URINE BACTERIA CULTURE: CPT | Performed by: EMERGENCY MEDICINE

## 2017-04-04 PROCEDURE — 81001 URINALYSIS AUTO W/SCOPE: CPT | Performed by: EMERGENCY MEDICINE

## 2017-04-04 PROCEDURE — 96361 HYDRATE IV INFUSION ADD-ON: CPT

## 2017-04-04 PROCEDURE — 96375 TX/PRO/DX INJ NEW DRUG ADDON: CPT

## 2017-04-04 PROCEDURE — 82803 BLOOD GASES ANY COMBINATION: CPT

## 2017-04-04 PROCEDURE — 76705 ECHO EXAM OF ABDOMEN: CPT

## 2017-04-04 PROCEDURE — 96365 THER/PROPH/DIAG IV INF INIT: CPT

## 2017-04-04 PROCEDURE — 99219 ZZC INITIAL OBSERVATION CARE,LEVL II: CPT | Performed by: INTERNAL MEDICINE

## 2017-04-04 PROCEDURE — 85025 COMPLETE CBC W/AUTO DIFF WBC: CPT | Performed by: EMERGENCY MEDICINE

## 2017-04-04 PROCEDURE — 87086 URINE CULTURE/COLONY COUNT: CPT | Performed by: EMERGENCY MEDICINE

## 2017-04-04 PROCEDURE — 25000132 ZZH RX MED GY IP 250 OP 250 PS 637: Performed by: EMERGENCY MEDICINE

## 2017-04-04 PROCEDURE — 87186 SC STD MICRODIL/AGAR DIL: CPT | Performed by: EMERGENCY MEDICINE

## 2017-04-04 PROCEDURE — 99285 EMERGENCY DEPT VISIT HI MDM: CPT | Mod: 25

## 2017-04-04 RX ORDER — NALOXONE HYDROCHLORIDE 0.4 MG/ML
.1-.4 INJECTION, SOLUTION INTRAMUSCULAR; INTRAVENOUS; SUBCUTANEOUS
Status: DISCONTINUED | OUTPATIENT
Start: 2017-04-04 | End: 2017-04-05 | Stop reason: HOSPADM

## 2017-04-04 RX ORDER — FLUOROURACIL 50 MG/G
CREAM TOPICAL WEEKLY
COMMUNITY

## 2017-04-04 RX ORDER — CIPROFLOXACIN 500 MG/1
500 TABLET, FILM COATED ORAL 2 TIMES DAILY
Status: DISCONTINUED | OUTPATIENT
Start: 2017-04-04 | End: 2017-04-05 | Stop reason: HOSPADM

## 2017-04-04 RX ORDER — ONDANSETRON 2 MG/ML
4 INJECTION INTRAMUSCULAR; INTRAVENOUS EVERY 6 HOURS PRN
Status: DISCONTINUED | OUTPATIENT
Start: 2017-04-04 | End: 2017-04-05 | Stop reason: HOSPADM

## 2017-04-04 RX ORDER — ACETAMINOPHEN 325 MG/1
650 TABLET ORAL ONCE
Status: COMPLETED | OUTPATIENT
Start: 2017-04-04 | End: 2017-04-04

## 2017-04-04 RX ORDER — ACETAMINOPHEN 325 MG/1
650 TABLET ORAL EVERY 4 HOURS PRN
Status: DISCONTINUED | OUTPATIENT
Start: 2017-04-04 | End: 2017-04-05 | Stop reason: HOSPADM

## 2017-04-04 RX ORDER — CIPROFLOXACIN 500 MG/1
500 TABLET, FILM COATED ORAL 2 TIMES DAILY
Qty: 14 TABLET | Refills: 0 | Status: SHIPPED | OUTPATIENT
Start: 2017-04-04 | End: 2017-04-11

## 2017-04-04 RX ORDER — ONDANSETRON 4 MG/1
4 TABLET, ORALLY DISINTEGRATING ORAL EVERY 6 HOURS PRN
Status: DISCONTINUED | OUTPATIENT
Start: 2017-04-04 | End: 2017-04-05 | Stop reason: HOSPADM

## 2017-04-04 RX ORDER — OXYCODONE AND ACETAMINOPHEN 5; 325 MG/1; MG/1
1 TABLET ORAL EVERY 4 HOURS PRN
Status: DISCONTINUED | OUTPATIENT
Start: 2017-04-04 | End: 2017-04-05 | Stop reason: HOSPADM

## 2017-04-04 RX ORDER — SODIUM CHLORIDE 9 MG/ML
1000 INJECTION, SOLUTION INTRAVENOUS CONTINUOUS
Status: DISCONTINUED | OUTPATIENT
Start: 2017-04-04 | End: 2017-04-05 | Stop reason: HOSPADM

## 2017-04-04 RX ORDER — MORPHINE SULFATE 4 MG/ML
4 INJECTION, SOLUTION INTRAMUSCULAR; INTRAVENOUS ONCE
Status: COMPLETED | OUTPATIENT
Start: 2017-04-04 | End: 2017-04-04

## 2017-04-04 RX ORDER — IBUPROFEN 600 MG/1
600 TABLET, FILM COATED ORAL EVERY 6 HOURS PRN
Status: DISCONTINUED | OUTPATIENT
Start: 2017-04-04 | End: 2017-04-05 | Stop reason: HOSPADM

## 2017-04-04 RX ORDER — CEFTRIAXONE 1 G/1
1 INJECTION, POWDER, FOR SOLUTION INTRAMUSCULAR; INTRAVENOUS ONCE
Status: COMPLETED | OUTPATIENT
Start: 2017-04-04 | End: 2017-04-04

## 2017-04-04 RX ORDER — CIPROFLOXACIN 500 MG/1
500 TABLET, FILM COATED ORAL ONCE
Status: COMPLETED | OUTPATIENT
Start: 2017-04-04 | End: 2017-04-04

## 2017-04-04 RX ORDER — LIDOCAINE 40 MG/G
CREAM TOPICAL
Status: DISCONTINUED | OUTPATIENT
Start: 2017-04-04 | End: 2017-04-05 | Stop reason: HOSPADM

## 2017-04-04 RX ORDER — PROCHLORPERAZINE 25 MG
25 SUPPOSITORY, RECTAL RECTAL EVERY 12 HOURS PRN
Status: DISCONTINUED | OUTPATIENT
Start: 2017-04-04 | End: 2017-04-05 | Stop reason: HOSPADM

## 2017-04-04 RX ORDER — PROCHLORPERAZINE MALEATE 5 MG
5-10 TABLET ORAL EVERY 6 HOURS PRN
Status: DISCONTINUED | OUTPATIENT
Start: 2017-04-04 | End: 2017-04-05 | Stop reason: HOSPADM

## 2017-04-04 RX ORDER — KETOROLAC TROMETHAMINE 30 MG/ML
30 INJECTION, SOLUTION INTRAMUSCULAR; INTRAVENOUS ONCE
Status: COMPLETED | OUTPATIENT
Start: 2017-04-04 | End: 2017-04-04

## 2017-04-04 RX ADMIN — CIPROFLOXACIN HYDROCHLORIDE 500 MG: 500 TABLET, FILM COATED ORAL at 21:28

## 2017-04-04 RX ADMIN — SODIUM CHLORIDE 1000 ML: 9 INJECTION, SOLUTION INTRAVENOUS at 18:21

## 2017-04-04 RX ADMIN — SODIUM CHLORIDE 1000 ML: 9 INJECTION, SOLUTION INTRAVENOUS at 20:16

## 2017-04-04 RX ADMIN — KETOROLAC TROMETHAMINE 30 MG: 30 INJECTION, SOLUTION INTRAMUSCULAR at 01:49

## 2017-04-04 RX ADMIN — CEFTRIAXONE 1 G: 1 INJECTION, POWDER, FOR SOLUTION INTRAMUSCULAR; INTRAVENOUS at 19:21

## 2017-04-04 RX ADMIN — MORPHINE SULFATE 4 MG: 4 INJECTION, SOLUTION INTRAMUSCULAR; INTRAVENOUS at 19:21

## 2017-04-04 RX ADMIN — ACETAMINOPHEN 650 MG: 325 TABLET, FILM COATED ORAL at 19:20

## 2017-04-04 RX ADMIN — CIPROFLOXACIN HYDROCHLORIDE 500 MG: 500 TABLET, FILM COATED ORAL at 01:49

## 2017-04-04 ASSESSMENT — ENCOUNTER SYMPTOMS
DYSURIA: 0
ABDOMINAL PAIN: 1
FEVER: 0
COUGH: 0
CHILLS: 1
DIZZINESS: 1
DIARRHEA: 1
FEVER: 1
ABDOMINAL PAIN: 1
FREQUENCY: 0
CHILLS: 1

## 2017-04-04 NOTE — NURSING NOTE
"Mary Anne Tavarez;   Chief Complaint   Patient presents with     Abdominal Pain     onset saturday, fever, chills, painful with taking a breath, nausea, diarrhea, d/c appetite, pain worse with bringing knees up to abdomen      Urgent Care     Initial BP 98/61 (BP Location: Right arm, Patient Position: Chair, Cuff Size: Adult Large)  Pulse 80  Temp 98.3  F (36.8  C) (Oral)  Ht 5' 3\" (1.6 m)  Wt 182 lb (82.6 kg)  SpO2 96%  BMI 32.24 kg/m2 Estimated body mass index is 32.24 kg/(m^2) as calculated from the following:    Height as of this encounter: 5' 3\" (1.6 m).    Weight as of this encounter: 182 lb (82.6 kg)..  BP completed using cuff size large.  Ashley Guzman R.N.  "

## 2017-04-04 NOTE — PROGRESS NOTES
"SUBJECTIVE:  HPI:  Mary Anne Tavarez is a 52 year old female who presents with the CC of abdominal pain in the RIGHT lower quadrant for days. LMP: 2017, heavier menses, reports irregular cycles due to menopause. Reports taking OTC Aleve every 4 hours with some relief for past 3 days. Reports fever, chills and myalgias for the past 3 days, last Aleve at 4pm today. Report ill contacts at work. Reports feelings of nausea, decreased appetite, no emesis. Does endorse some diarrhea for 4 day. Denies any concerns of STD, with one male partner.     Pain is located in the RLQ area, with radiation to None and umbilicus.  The pain is characterized as sharp, and at worst is a level 6-7 on a scale of 1-10.  Pain has been present for 5 day(s) and is rapidly progressive.  EXACERBATING FACTORS: cough/deep breath and movement/walking  RELIEVING FACTORS: position, \"keeping leg stretched\".  ASSOCIATED SX: nausea, diarrhea, fever, chills, sweats and myalgias.    Reports no abdominal surgeries, 3  with no concerns of uterine masses or ovarian cysts.     No past medical history on file.  Current Outpatient Prescriptions   Medication Sig Dispense Refill     Naproxen Sodium (ALEVE PO)        Social History   Substance Use Topics     Smoking status: Not on file     Smokeless tobacco: Not on file     Alcohol use Not on file     OBJECTIVE:  BP 98/61 (BP Location: Right arm, Patient Position: Chair, Cuff Size: Adult Large)  Pulse 80  Temp 98.3  F (36.8  C) (Oral)  Ht 5' 3\" (1.6 m)  Wt 182 lb (82.6 kg)  SpO2 96%  BMI 32.24 kg/m2  GENERAL APPEARANCE: healthy, alert and no distress  CV: S1, S2 auscultated with regular rate and rhythm, no gallops, murmurs or rubs noted  Lungs: Bilateral lobes clear throughout without wheezes, crackles, or rhonchi noted.   ABDOMEN: soft, with localized tenderness in RLQ with superficial and deep palpation. Voluntary guarding noted of RLQ. Bowel sounds in all four quadrants, hyperactive. No masses " noted.    ASSESSMENT: RLQ pain    PLAN: Discussed with patient need for further evaluation in the ED due to concern for appendicitis versus other acute abdominal processes. Patient agreed to the plan of care, transport via personal care with boyfriend. Report called to [facility] ED, to provider on call, [place].     JOAO Page, CNP

## 2017-04-04 NOTE — ED PROVIDER NOTES
"  History     Chief Complaint:  Fever and Chills     HPI   Mary Anne Tavarez is a 52 year old female who presents to the emergency department for evaluation of fevers and chills.  The patient was evaluated here in the ED yesterday with the below work-up performed where she was diagnosed with acute pyelonephritis.  She was given a dose of Cipro prior to being discharged on the same at 0300 this morning.  The patient took her second dose of Cipro this morning at 0600, however despite this and the use of Ibuprofen 600 mg every four hours has had a progressively worsening fever up to 101.6 degrees at 1800.  After contacting the nurse's line the patient presented to the ED for reevaluation.  She has had associated chills as well as right-sided abdominal with this which is exacerbated with deep inspirations.  The patient denies any urinary symptoms including dysuria or frequency and has had no vaginal bleeding or discharge.  She denies any chest pain or cough, but endorses that when her Ibuprofen wears off her lungs \"feel heavy.\"     Work-up performed on 04/03/2017 at [facility] ED:  Abdomen US, limited (RUQ only):  IMPRESSION:  1. Heterogeneous appearance of the mid right kidney. This could be a mass or prominent cortical lobule. No hydronephrosis.  2. No gallstone or biliary dilatation.  Per radiology     CT Abdomen Pelvis w Contrast  IMPRESSION:  1. Heterogeneous enhancement in the mid and lower pole of the right kidney with mild surrounding inflammation. This may be acute pyelonephritis. Subtle renal mass is not completely ruled out and  follow-up is recommended.  2. No appendicitis or other bowel inflammation or obstruction.  3. Small amount of free fluid in the pelvis, within physiologic limits.  Per radiology.      Laboratory:  CBC: WBC 12.1 (H), HGB 12.0,   CMP: WNL (Creatinine 0.81)  Lipase: 131    UA (2237): Clear yellow urine, Blood Large, Albumin 30, Leukocyte Esterase Moderate, WBC 25-50, RBC 2-5, Squamous " "Epithelial Many, Bacteria Moderate, o/w negative.   UA (0003): Clear straw urine, Blood Moderate, Leukocyte Esterase Moderate, WBC 10 (H), Bacteria Few, o/w negative.   Urine Culture x2: pending    Allergies:  NKDA     Medications:    Cipro   Aleve  Omeprazole      Past Medical History:    GERD    Past Surgical History:    Breast augmentation   Genitourinary surgery     Family History:    Family history of esophageal reflux and pulmonary fibrosis.      Social History:  The patient presented to the ED with her daughter.  Tobacco use: Former smoker  Alcohol use: Yes   Marital Status:  Single      Review of Systems   Constitutional: Positive for chills and fever.   Respiratory: Negative for cough.    Cardiovascular: Negative for chest pain.   Gastrointestinal: Positive for abdominal pain.   Genitourinary: Negative for dysuria, frequency, vaginal bleeding and vaginal discharge.   All other systems reviewed and are negative.      Physical Exam   First Vitals:  BP: 110/42  Pulse: 89  Temp: 100.5  F (38.1  C)  Resp: 18  Height: 160 cm (5' 3\")  Weight: 82.6 kg (182 lb)  SpO2: 95 %      Physical Exam  Physical Exam   General:  Sitting on bed with daughter at bedside.   HENT:  No obvious trauma to head  Right Ear:  External ear normal.   Left Ear:  External ear normal.   Nose:  Nose normal.   Eyes:  Conjunctivae and EOM are normal. Pupils are equal, round, and reactive.   Neck: Normal range of motion. Neck supple. No tracheal deviation present.   CV:  Normal heart sounds. No murmur heard.  Pulm/Chest: Effort normal and breath sounds normal.   Abd: Soft. No distension. There is no tenderness. There is no rigidity, no rebound and no guarding.   M/S: Normal range of motion.   Right CVA tenderness.   Neuro: Alert. GCS 15.  Skin: Skin is warm and dry. No rash noted. Not diaphoretic.   Psych: Normal mood and affect. Behavior is normal.      Emergency Department Course   Laboratory:  1834 ISTAT Gases Lactate: pH 7.38, CO2 43, O2 38, " Bicarbonate 25, O2 Sat 71, Lactic Acid 0.6 low    CBC: WBC 12.0 high, HGB 10.9 low, o/w WNL. ()    BMP: Calcium 8.4 low, o/w WNL. (Creatinine 0.74)     Interventions:  NS 1,000 mL IV  Tylenol 650 mg PO   Morphine 4 mg IV  Rocephin 1 g IV     Emergency Department Course:  Nursing notes and vitals reviewed.   1830: I performed an exam of the patient as documented above.     The above work-up was undertaken.    1903: I spoke with Dr. Chan, of the hospitalist service regarding the patient.      Findings and plan explained to the patient and daughter who consents to observation. Discussed the patient with ?Dr. Chan, who will place the patient in observation in the observation  area.     Impression & Plan    Medical Decision Making:  Mary Anne Tavarez is a very pleasant 52 year old year old patient who presents to the emergency department with concern of right flank pain.  She was seen for this yesterday and diagnosed with pyelonephritis after having a CT and ultrasound down.  The patient continues to have pain.  She had one dose of Cipro this morning, but does not feel comfortable continuing to be treated at home.  She is provided some Morphine and felt somewhat better.  She requested admission.  I spoke to Dr. Chan who has agreed to admit the patient for observation for continued evaluation and treatment.  The patient has a nonsurgical abdomen and I do not believe any imaging needs to be repeated at this time.      Diagnosis:    ICD-10-CM    1. Acute pyelonephritis N10        Disposition:  Admission     Da Khan  4/4/2017    EMERGENCY DEPARTMENT       Da Khan DO  04/04/17 1940

## 2017-04-04 NOTE — PATIENT INSTRUCTIONS
Abdominal Pain  Abdominal pain is pain in the stomach or intestinal area. Everyone has this pain from time to time. In many cases it goes away on its own. But abdominal pain can sometimes be due to a serious problem, such as appendicitis. So it s important to know when to seek help.  Causes of abdominal pain  There are many possible causes of abdominal pain. Common causes in adults include:    Constipation, diarrhea, or gas    GERD (gastroesophageal reflux disease) movement of stomach acid into the esophagus, also known as acid reflux or heartburn    Peptic ulcer (a sore in the lining of the stomach or small intestine)    Inflammation of the gallbladder, liver, or pancreas    Gallstones or kidney stones    Appendicitis     Obstruction of the intestines     Hernia (bulging of an internal organ through a muscle or other tissue)    Urinary tract infections    In women, menstrual cramps, fibroids, or endometriosis of the uterus    Inflammation or infection of the intestines  Diagnosing the cause of abdominal pain  Your health care provider will examine you to help find the cause of your pain. If needed, tests will be ordered. Because abdominal pain has so many possible causes, it can be hard to discover the reason for the pain. Giving details about your pain can help. Be ready to tell your health care provider where and when you feel the pain and what makes it better or worse. Also mention whether you have other symptoms such as fever, tiredness, nausea, vomiting, or changes in bathroom habits.  Treating abdominal pain  Certain causes of pain, such as appendicitis or a bowel obstruction, need emergency treatment. Other problems can be treated with rest, fluids, or medications. Your health care provider can give you specific instructions for treatment or self-care based on the cause of your pain.  If you have vomiting or diarrhea, sip water or other clear fluids. When you are ready to eat solid foods again, start with  small amounts of easy-to-digest, low-fat foods, such as applesauce, toast, or crackers.   When to call the doctor  Call 911 or go to the hospital right away if you:    Can t pass stool and are vomiting    Are vomiting blood or have black, tarry diarrhea    Also have chest, neck, or shoulder pain    Feel like you are about to pass out    Have pain in your shoulder blades with nausea    Have sudden, excruciating abdominal pain    Have new, severe pain unlike any you have felt before    Have a belly that is rigid, hard, and tender to touch  Call your doctor if you have:    Pain for more than 5 days    Bloating for more than 2 days    Diarrhea for more than 5 days    Fever of 101 F (38.3 C) or higher    Pain that continues to worsen    Unexplained weight loss    Continued lack of appetite    Blood in the stool  How to prevent abdominal pain  Here are some tips to help prevent abdominal pain:    Eat smaller amounts of food at one time.    Avoid greasy, fried, or other high-fat foods.    Avoid foods that give you gas.    Exercise regularly.    Drink plenty of fluids.  To help prevent symptoms of gastroesophageal reflux disease (GERD):    Quit smoking.    Reduce alcohol and certain foods that increase stomach acid.     Lose excess weight.    Finish eating at least 2 hours before you go to bed or lie down.    Elevate the head of your bed.    1751-1159 [employer]. [address] [place] [address], [place] [address]. All rights reserved. This information is not intended as a substitute for professional medical care. Always follow your healthcare professional's instructions.

## 2017-04-04 NOTE — IP AVS SNAPSHOT
[facility]    [address] [place] [id number]    Phone:  [phone]                                       After Visit Summary   4/4/2017    Mary Anne Tavarez    MRN: [id number]           After Visit Summary Signature Page     I have received my discharge instructions, and my questions have been answered. I have discussed any challenges I see with this plan with the nurse or doctor.    ..........................................................................................................................................  Patient/Patient Representative Signature      ..........................................................................................................................................  Patient Representative Print Name and Relationship to Patient    ..................................................               ................................................  Date                                            Time    ..........................................................................................................................................  Reviewed by Signature/Title    ...................................................              ..............................................  Date                                                            Time

## 2017-04-04 NOTE — TELEPHONE ENCOUNTER
"Call Type: Triage Call    Presenting Problem: \"I was discharged from [facility] last  night with a kidney infection.  I now have a fever of 101.6F, which  I did not have last night in the ER.\"  Chills present.  Took Advil  at 3:24pm today, temp at 4:15 is 101.6F.  Patient currently on  antibiotics.  Per Urinary Symptoms guideline patient is recommended  to be seen within 4 hours.  AVS from [facility] to return to  the ED with a fever of 101.5 F or greater.  Patient states she will  go in.  Triage Note:  Guideline Title: Urinary Symptoms - Female ; Urinary Symptoms - Female  Recommended Disposition: See Provider within 4 hours  Original Inclination: Wanted to speak with a nurse  Override Disposition: See ED Immediately  Intended Action: Go to Hospital / ED  Physician Contacted: No  Urinary tract symptoms AND fever 101.5 F (38.6 C) or higher or vomiting ?  YES  Blood in urine ? NO  Abnormal vaginal discharge (such as increased quantity, abnormal color,  consistency, odor) ? NO  Flank pain ? NO  New or worsening signs and symptoms that may indicate shock ? NO  Any temperature elevation in a frail elderly, immunocompromised or pregnant person  ? NO  Unbearable abdominal/pelvic pain ? NO  Current or recent urinary tract instrumentation AND urinary tract symptoms OR no  urine flow ? NO  Any other unexpected urinary symptoms following urinary tract or abdominal surgery  within timeframe specified by provider ? NO  Physician Instructions:  Care Advice: Another adult should drive.  Call provider if symptoms worsen or new symptoms develop.  Analgesic/Antipyretic Advice - NSAIDs: Consider aspirin, ibuprofen,  naproxen or ketoprofen for pain or fever as directed on label or by  pharmacist/provider. PRECAUTIONS: - You should not take this medicine for  more than 10 days unless recommended by your provider. EXCEPTIONS: - Should  not be used if taking blood thinners or have bleeding problems. - Do not  use if have " history of sensitivity/allergy to any of these medications  or history of cardiovascular, ulcer, kidney, liver disease or diabetes  unless approved by provider. - Do not exceed recommended dose or frequency.  Analgesic/Antipyretic Advice - Acetaminophen: Consider acetaminophen as  directed on label or by pharmacist/provider for pain or fever. PRECAUTIONS:  - Use if there is no history of liver disease, alcoholism, or intake of  three or more alcohol drinks per day - Only if approved by provider during  pregnancy or when breastfeeding - Do not exceed recommended dose or  frequency. Do not take more than 3000 milligrams (mg) in 24 hours. Do not  take this medicine for more than 10 days unless recommended by your  provider. - During pregnancy, acetaminophen should not be taken more than 3  consecutive days without telling provider - To make sure you don't take too  much, check other medicines you take to see if they also contain  acetaminophen.

## 2017-04-04 NOTE — ED PROVIDER NOTES
"  History     Chief Complaint:  Abdominal Pain    HPI   Mary Anne Tavarez is a 52 year old female who presents with right sided quadrant abdominal pain. The patient states that she had an onset of right upper quadrant abdominal pain localized under her right breast as well as in the lwoer right abdomen that started 4 days ago. The patient notes that 3 days ago she went exercising at a kickboxing class and had an onset of chills, dizziness, and generalized body aches \"all the way to her fingertips.\" The patient has associated diarrhea. The patient denies fever at this time.    Allergies:  No known drug allergies.    Medications:    Omeprazole  Naproxen     Past Medical History:    History reviewed.  No significant past medical history.     Past Surgical History:    History reviewed. No pertinent past surgical history.    Family History:    History reviewed. No pertinent family history.    Social History:  Marital Status: Single  Presents to the ED alone     Review of Systems   Constitutional: Positive for chills. Negative for fever.   Gastrointestinal: Positive for abdominal pain (right upper quadrant) and diarrhea.   Musculoskeletal:        Positive for generalized body aches.   Neurological: Positive for dizziness.   10 point review of systems performed and is negative except as above and in HPI.    Physical Exam   First Vitals:  BP: 120/52  Pulse: 80  Temp: 98.2  F (36.8  C)  Resp: 16  Height: 160 cm (5' 3\")  Weight: 82.6 kg (182 lb)  SpO2: 96 %    Physical Exam  General: Resting on the gurney.  Head:  The scalp, face, and head appear normal  Mouth/Throat: Mucus membranes are moist  CV:  Regular rate    Normal S1 and S2  No pathological murmur   Resp:  Breath sounds clear and equal bilaterally    Non-labored, no retractions or accessory muscle use    No coarseness    No wheezing   GI:  Abdomen is soft, no rigidity    Diffuse abdominal tenderness worse in the right lower quadrant, positive   Rivers's sign, voluntary " guarding, no rebound    No tenderness to palpation  MS:  Normal motor assessment of all extremities.    Good capillary refill noted.  Neuro: Speech is normal and fluent. No apparent deficit.  Psych:  Awake. Alert.  Normal affect.      Appropriate interactions.    Emergency Department Course   Imaging:  Radiographic findings were communicated with the patient who voiced understanding of the findings.    Abdomen US, limited (RUQ only)   Preliminary Result   IMPRESSION:   1. Heterogeneous appearance of the mid right kidney. This could be a   mass or prominent cortical lobule. No hydronephrosis.   2. No gallstone or biliary dilatation.      CT Abdomen Pelvis w Contrast   Preliminary Result   IMPRESSION:   1. Heterogeneous enhancement in the mid and lower pole of the right   kidney with mild surrounding inflammation. This may be acute   pyelonephritis. Subtle renal mass is not completely ruled out and   follow-up is recommended.   2. No appendicitis or other bowel inflammation or obstruction.   3. Small amount of free fluid in the pelvis, within physiologic   limits.        All Readings Per Radiology Unless Otherwise Noted.    Laboratory:  CBC: WBC 12.1 (H), HGB 12.0,   CMP: WNL (Creatinine 0.81)  Lipase: 131  UA (2237): Clear yellow urine, Blood Large, Albumin 30, Leukocyte Esterase Moderate, otherwise WNL  UA, Microscopic: WBC 25-50, RBC 2-5, Squamous Epithelial / LPF Many, Bacteria Moderate  Urine Culture x2: pending  UA (0003): Clear straw urine, Blood Moderate, Leukocyte Esterase Moderate, WBC 10 (H), Bacteria Few, otherwise WNL     Interventions:  (2241) Normal Saline, 1 liter, IV bolus  (0149) Toradol, 30 mg, IV injection  (0149) Ciprofloxacin 500 mg, PO    ED Course:  Nursing notes and past medical history reviewed.   I performed a physical examination of the patient as documented above.  I explained the plan with the patient who consents to this.   The patient was sent for a CT abdomen while in the  emergency department, findings above.   Blood was drawn from the patient. This was sent for laboratory testing, findings above.   Urine sample was obtained and sent for laboratory analysis, findings above.  (0144) Rechecked the patient.  Findings and plan explained to the patient. Patient discharged home with instructions regarding supportive care, medications, and reasons to return. The importance of close follow-up was reviewed. The patient was prescribed Ciprofloxacin.     Impression & Plan    Medical Decision Making:   Mary Anne Tavarez is a 52 year old female who presents for evaluation of right upper quadrant abdominal pain.  Urinalysis is consistent with a urinary tract infection; given the systemic symptoms present as well as CT, signs and symptoms consistent with pyelonephritis.  CT ordered initially because of tenderness to palpation and no appendicitis or stone seen.  RUQ US without evidence of hepatobiliary disease.  There is no clinical evidence of perinephric abscess, emphysematous pyelonephritis, ureterolithiasis, appendicitis, colitis, diverticulitis or any intraabdominal catastrophe.  Patient was given a dose of antibiotics in ED; see above.     Given well appearance, I believe the risk of imminent deterioration is low enough that outpatient management is indicated.  Return if increasing pain, vomiting, fever, or inability to tolerate the oral antibiotic.  Follow up with primary physician is indicated in 1 days.    Diagnosis:    ICD-10-CM    1. Acute pyelonephritis N10        Disposition:   Discharge to home.     Discharge Medications:   New Prescriptions    CIPROFLOXACIN (CIPRO) 500 MG TABLET    Take 1 tablet (500 mg) by mouth 2 times daily for 7 days         Steve JOHNSON, am serving as a scribe on 4/4/2017 at 12:12 AM to personally document services performed by Bindu Ashley MD, based on my observations and the provider's statements to me.       Bindu Ashley MD  04/04/17 0599

## 2017-04-04 NOTE — IP AVS SNAPSHOT
MRN:[id number]                      After Visit Summary   4/4/2017    Mary Anne Tavarez    MRN: [id number]           Thank you!     Thank you for choosing [facility] for your care. Our goal is always to provide you with excellent care. Hearing back from our patients is one way we can continue to improve our services. Please take a few minutes to complete the written survey that you may receive in the mail after you visit with us. Thank you!        Patient Information     Date Of Birth          1965        About your hospital stay     You were [facility] on:  April 4, 2017 [facility]    You were discharged on:  April 5, 2017        Reason for your hospital stay       You were admitted due to pain related to your pyelonephritis.  You were given an additional dose of antibiotics as well as narcotic pain medication.  You are to continue with the prescribed antibiotics until prescription is finished.  You may use narcotic pain medication as needed for further discomfort, can also use ibuprofen and/or tylenol.  Do not take tylenol with the narcotic pain medication, as that also has tylenol.  Return to ED if you develop worsening fever, chills, urinary symptoms, abdominal or back pain.                  Who to Call     For medical emergencies, please call 911.  For non-urgent questions about your medical care, please call your primary care provider or clinic, None          Attending Provider     Provider Specialty    Da Khan, DO Emergency Medicine    Emre Chan, DO Internal Medicine       Primary Care Provider    None       No address on file        After Care Instructions     Activity       Your activity upon discharge: activity as tolerated            Diet       Follow this diet upon discharge: Orders Placed This Encounter      Regular Diet Adult                  Follow-up Appointments     Follow-up and recommended labs and tests         "Follow up with primary care provider, None, within 1-2 weeks for hospital follow- up.  No follow up labs or test are needed.                  Pending Results     Date and Time Order Name Status Description    4/3/2017 2342 Urine Culture Preliminary     4/3/2017 2237 Urine Culture Aerobic Bacterial Preliminary             Statement of Approval     Ordered          17 0946  I have reviewed and agree with all the recommendations and orders detailed in this document.  EFFECTIVE NOW     Approved and electronically signed by:  Vince Boyd PA-C             Admission Information     Date & Time Provider Department Dept. Phone    2017 Emre Chan DO [facility] [phone]      Your Vitals Were     Blood Pressure Pulse Temperature Respirations Height Weight    103/44 (BP Location: Right arm) 76 99  F (37.2  C) (Oral) 16 1.6 m (5' 2.99\") 82.6 kg (182 lb)    Last Period Pulse Oximetry BMI (Body Mass Index)             2017 95% 32.25 kg/m2         [employer] Information     [employer] lets you send messages to your doctor, view your test results, renew your prescriptions, schedule appointments and more. To sign up, go to [email or web address] . Click on \"Log in\" on the left side of the screen, which will take you to the Welcome page. Then click on \"Sign up Now\" on the right side of the page.     You will be asked to enter the access code listed below, as well as some personal information. Please follow the directions to create your username and password.     Your access code is: [email or web address]  Expires: 2017  9:04 PM     Your access code will  in 90 days. If you need help or a new code, please call your [facility] clinic or [phone].        Care EveryWhere ID     This is your Care EveryWhere ID. This could be used by other organizations to access your [facility] medical records  [id number]           Review of your medicines      START taking        Dose / Directions    " oxyCODONE-acetaminophen 5-325 MG per tablet   Commonly known as:  PERCOCET   Used for:  Acute pyelonephritis        Dose:  1 tablet   Take 1 tablet by mouth every 4 hours as needed for moderate to severe pain   Quantity:  10 tablet   Refills:  0         CONTINUE these medicines which have NOT CHANGED        Dose / Directions    ALEVE PO        Dose:  1 tablet   Take 1 tablet by mouth 2 times daily as needed   Refills:  0       CALCIUM + D PO        Dose:  1 tablet   Take 1 tablet by mouth 2 times daily   Refills:  0       ciprofloxacin 500 MG tablet   Commonly known as:  CIPRO        Dose:  500 mg   Take 1 tablet (500 mg) by mouth 2 times daily for 7 days   Quantity:  14 tablet   Refills:  0       fluorouracil 5 % cream   Commonly known as:  EFUDEX        Apply topically once a week   Refills:  0       IBUPROFEN PO        Dose:  600 mg   Take 600 mg by mouth every 4 hours as needed for moderate pain   Refills:  0       OMEPRAZOLE PO        Dose:  20 mg   Take 20 mg by mouth   Refills:  0            Where to get your medicines      Some of these will need a paper prescription and others can be bought over the counter. Ask your nurse if you have questions.     Bring a paper prescription for each of these medications     oxyCODONE-acetaminophen 5-325 MG per tablet                Protect others around you: Learn how to safely use, store and throw away your medicines at www.disposemymeds.org.             Medication List: This is a list of all your medications and when to take them. Check marks below indicate your daily home schedule. Keep this list as a reference.      Medications           Morning Afternoon Evening Bedtime As Needed    ALEVE PO   Take 1 tablet by mouth 2 times daily as needed                                CALCIUM + D PO   Take 1 tablet by mouth 2 times daily                                ciprofloxacin 500 MG tablet   Commonly known as:  CIPRO   Take 1 tablet (500 mg) by mouth 2 times daily for 7 days    Last time this was given:  500 mg on 4/5/2017  7:28 AM                                fluorouracil 5 % cream   Commonly known as:  EFUDEX   Apply topically once a week                                IBUPROFEN PO   Take 600 mg by mouth every 4 hours as needed for moderate pain                                OMEPRAZOLE PO   Take 20 mg by mouth                                oxyCODONE-acetaminophen 5-325 MG per tablet   Commonly known as:  PERCOCET   Take 1 tablet by mouth every 4 hours as needed for moderate to severe pain   Last time this was given:  1 tablet on 4/5/2017 10:35 AM

## 2017-04-05 VITALS
OXYGEN SATURATION: 95 % | SYSTOLIC BLOOD PRESSURE: 103 MMHG | HEART RATE: 76 BPM | BODY MASS INDEX: 32.25 KG/M2 | WEIGHT: 182 LBS | HEIGHT: 63 IN | TEMPERATURE: 99 F | RESPIRATION RATE: 16 BRPM | DIASTOLIC BLOOD PRESSURE: 44 MMHG

## 2017-04-05 PROCEDURE — 96361 HYDRATE IV INFUSION ADD-ON: CPT

## 2017-04-05 PROCEDURE — G0378 HOSPITAL OBSERVATION PER HR: HCPCS

## 2017-04-05 PROCEDURE — 25000128 H RX IP 250 OP 636: Performed by: EMERGENCY MEDICINE

## 2017-04-05 PROCEDURE — 25000132 ZZH RX MED GY IP 250 OP 250 PS 637: Performed by: INTERNAL MEDICINE

## 2017-04-05 PROCEDURE — 99217 ZZC OBSERVATION CARE DISCHARGE: CPT | Performed by: PHYSICIAN ASSISTANT

## 2017-04-05 RX ORDER — OXYCODONE AND ACETAMINOPHEN 5; 325 MG/1; MG/1
1 TABLET ORAL EVERY 4 HOURS PRN
Qty: 10 TABLET | Refills: 0 | Status: SHIPPED | OUTPATIENT
Start: 2017-04-05

## 2017-04-05 RX ADMIN — ACETAMINOPHEN 650 MG: 325 TABLET, FILM COATED ORAL at 07:28

## 2017-04-05 RX ADMIN — SODIUM CHLORIDE 1000 ML: 9 INJECTION, SOLUTION INTRAVENOUS at 03:45

## 2017-04-05 RX ADMIN — OXYCODONE HYDROCHLORIDE AND ACETAMINOPHEN 1 TABLET: 5; 325 TABLET ORAL at 10:35

## 2017-04-05 RX ADMIN — CIPROFLOXACIN HYDROCHLORIDE 500 MG: 500 TABLET, FILM COATED ORAL at 07:28

## 2017-04-05 RX ADMIN — OXYCODONE HYDROCHLORIDE AND ACETAMINOPHEN 1 TABLET: 5; 325 TABLET ORAL at 03:45

## 2017-04-05 ASSESSMENT — PAIN DESCRIPTION - DESCRIPTORS: DESCRIPTORS: ACHING;CONSTANT

## 2017-04-05 NOTE — PHARMACY-ADMISSION MEDICATION HISTORY
Admission medication history interview status for the 4/4/2017  admission is complete. See EPIC admission navigator for prior to admission medications     Medication history source reliability:Good    Actions taken by pharmacist (provider contacted, etc):None     Additional medication history information not noted on PTA med list : She has taken acyclovir as pretreatment for phototherapy, last 9/2016.  -She was previously on sertraline but no longer taking.  -She reports receiving IV cipro yesterday evening at urgent care and started oral cipro this morning.    Medication reconciliation/reorder completed by provider prior to medication history? No    Time spent in this activity: 10 min    Prior to Admission medications    Medication Sig Last Dose Taking? Auth Provider   ciprofloxacin (CIPRO) 500 MG tablet Take 1 tablet (500 mg) by mouth 2 times daily for 7 days 4/4/2017 at am Yes Bindu Ashley MD   IBUPROFEN PO Take 600 mg by mouth every 4 hours as needed for moderate pain 4/4/2017 at am Yes Unknown, Entered By History   Calcium Citrate-Vitamin D (CALCIUM + D PO) Take 1 tablet by mouth 2 times daily 4/4/2017 at am Yes Unknown, Entered By History   fluorouracil (EFUDEX) 5 % cream Apply topically once a week Past Week at Unknown time Yes Unknown, Entered By History   Naproxen Sodium (ALEVE PO) Take 1 tablet by mouth 2 times daily as needed  Past Week at Unknown time Yes Reported, Patient   OMEPRAZOLE PO Take 20 mg by mouth 4/4/2017 at am Yes Reported, Patient

## 2017-04-05 NOTE — DISCHARGE SUMMARY
[facility]    Discharge Summary  Hospitalist    Date of Admission:  4/4/2017  Date of Discharge:  4/5/2017  Discharging Provider: Vince Boyd PA-C    Discharge Diagnoses   Acute pyelonephritis    History of Present Illness   Mary Anne Tavarez is an 52 year old female who presented with ongoing flank pain and recent diagnosis of pyelonephritis.    For complete details of admission, please refer to H&P performed by Emre Chan DO.    Hospital Course   Mary Anne Tavarez was admitted on 4/4/2017.  The following problems were addressed during her hospitalization:    Acute right-sided pyelonephritis: Pt had solely taken one dose of PO Cipro prior to admission.  Received IV ceftriaxone in ED x1, and was continued on PTA Cipro as initially prescribed. Urine cultures are pending at discharge, preliminarily positive for gram negative rods for which Cipro should be sufficient.  Pain was treated with PRN ibuprofen and percocet.  Patient was provided a prescription for percocet at discharge, and instructed to continue taking antibiotic until full course completed.    Gastroesophageal reflux disease: Resume PPI upon discharge.     Heterogeneous right kidney: Suspected to be due to pyelonephritis and expected to resolve with treatment. Discussed recommendations to follow-up with PCP following treatment for possible repeat imaging to ensure resolved.  Pt verbalized understanding; although does not have a PCP listed has information on providers seen in past.    Vince Boyd PA-C    Significant Results and Procedures   As noted below    Pending Results   These results will be followed up by myself or PCP  Unresulted Labs Ordered in the Past 30 Days of this Admission     Date and Time Order Name Status Description    4/3/2017 2342 Urine Culture Preliminary     4/3/2017 2237 Urine Culture Aerobic Bacterial Preliminary           Code Status   Full Code       Primary Care Physician   None    Physical Exam   Temp: 99  F  (37.2  C) Temp src: Oral BP: 103/44 Pulse: 76   Resp: 16 SpO2: 95 % O2 Device: None (Room air)    Vitals:    04/04/17 1727   Weight: 82.6 kg (182 lb)     Vital Signs with Ranges  Temp:  [96.8  F (36  C)-100.5  F (38.1  C)] 99  F (37.2  C)  Pulse:  [68-89] 76  Resp:  [16-18] 16  BP: ()/(40-44) 103/44  SpO2:  [94 %-96 %] 95 %       GEN: well-developed, well-nourished, appears comfortable  PULM: lungs CTA bilaterally, no increased work of breathing, no wheeze, rales, rhonchi  CV: RRR, S1 & S2, no murmur  GI: soft, nontender, nondistended, no guarding or rigidity, +BS in all 4 quadrants, no CVA tenderness  SKIN: warm & dry without rash, wound, or pedal edema, no bruising or bleeding    Discharge Disposition   Discharged to home  Condition at discharge: Stable    Consultations This Hospital Stay   None    Time Spent on this Encounter   I, Vince Boyd, personally saw the patient today and spent greater than 30 minutes discharging this patient.    Discharge Orders     Reason for your hospital stay   You were admitted due to pain related to your pyelonephritis.  You were given an additional dose of antibiotics as well as narcotic pain medication.  You are to continue with the prescribed antibiotics until prescription is finished.  You may use narcotic pain medication as needed for further discomfort, can also use ibuprofen and/or tylenol.  Do not take tylenol with the narcotic pain medication, as that also has tylenol.  Return to ED if you develop worsening fever, chills, urinary symptoms, abdominal or back pain.     Follow-up and recommended labs and tests    Follow up with primary care provider, None, within 1-2 weeks for hospital follow- up.  No follow up labs or test are needed.     Activity   Your activity upon discharge: activity as tolerated     Diet   Follow this diet upon discharge: Orders Placed This Encounter     Regular Diet Adult       Discharge Medications   Current Discharge Medication List      START  taking these medications    Details   oxyCODONE-acetaminophen (PERCOCET) 5-325 MG per tablet Take 1 tablet by mouth every 4 hours as needed for moderate to severe pain  Qty: 10 tablet, Refills: 0    Associated Diagnoses: Acute pyelonephritis         CONTINUE these medications which have NOT CHANGED    Details   ciprofloxacin (CIPRO) 500 MG tablet Take 1 tablet (500 mg) by mouth 2 times daily for 7 days  Qty: 14 tablet, Refills: 0      IBUPROFEN PO Take 600 mg by mouth every 4 hours as needed for moderate pain      Calcium Citrate-Vitamin D (CALCIUM + D PO) Take 1 tablet by mouth 2 times daily      fluorouracil (EFUDEX) 5 % cream Apply topically once a week      Naproxen Sodium (ALEVE PO) Take 1 tablet by mouth 2 times daily as needed       OMEPRAZOLE PO Take 20 mg by mouth           Allergies   No Known Allergies  Data   Most Recent 3 CBC's:  Recent Labs   Lab Test  04/04/17 1820  04/03/17   2215   WBC  12.0*  12.1*   HGB  10.9*  12.0   MCV  88  89   PLT  262  254      Most Recent 3 BMP's:  Recent Labs   Lab Test  04/04/17   1820  04/03/17   2215   NA  140  141   POTASSIUM  3.8  3.6   CHLORIDE  104  105   CO2  26  28   BUN  11  13   CR  0.74  0.81   ANIONGAP  10  8   BELINDA  8.4*  8.7   GLC  96  91     Most Recent 2 LFT's:  Recent Labs   Lab Test  04/03/17   2215   AST  36   ALT  39   ALKPHOS  100   BILITOTAL  0.5     Most Recent INR's and Anticoagulation Dosing History:  Anticoagulation Dose History     There is no flowsheet data to display.        Most Recent 3 Troponin's:No lab results found.  Most Recent Cholesterol Panel:No lab results found.  Most Recent 6 Bacteria Isolates From Any Culture (See EPIC Reports for Culture Details):  Recent Labs   Lab Test  04/04/17   0003  04/03/17 2237   CULT  >100,000 colonies/mL Non lactose fermenting gram negative rods Susceptibility   testing in progress  <10,000 colonies/mL urogenital anselmo  *  >100,000 colonies/mL Non lactose fermenting gram negative rods  Susceptibility  testing in progress  *     Most Recent TSH, T4 and A1c Labs:No lab results found.  Results for orders placed or performed during the hospital encounter of 04/03/17   CT Abdomen Pelvis w Contrast    Narrative    CT ABDOMEN PELVIS W CONTRAST  4/3/2017 11:06 PM      HISTORY: Right lower quadrant pain.    TECHNIQUE: CT abdomen and pelvis with intravenous contrast. Radiation  dose for this scan was reduced using automated exposure control,  adjustment of the mA and/or kV according to patient size, or iterative  reconstruction technique. 92 mL Isovue-370.     COMPARISON: None.    FINDINGS:  Abdomen: There is mild dependent atelectasis at the lung bases. The  heart size is normal. Bilobar breast prostheses. The liver, spleen,  gallbladder, pancreas, adrenal glands and left kidney are normal in  appearance. There are subtle areas of decreased enhancement in the mid  and lower pole of the right kidney. Mild inflammation of the adjacent  fat. Subtle renal mass is not completely ruled out. There is no  abdominal or pelvic lymph node enlargement.    Pelvis: The uterus and adnexa appear normal. There is no bowel  obstruction or inflammation. The appendix is normal. Small amount of  free fluid in the pelvis. No free intraperitoneal gas.      Impression    IMPRESSION:  1. Heterogeneous enhancement in the mid and lower pole of the right  kidney with mild surrounding inflammation. This may be acute  pyelonephritis. Subtle renal mass is not completely ruled out and  follow-up is recommended.  2. No appendicitis or other bowel inflammation or obstruction.  3. Small amount of free fluid in the pelvis, within physiologic  limits.    CORNELIUS GRANDE MD   Abdomen US, limited (RUQ only)    Narrative    US ABDOMEN LIMITED  4/4/2017 12:43 AM      HISTORY: Pain.    COMPARISON: CT 4/3/2017.    FINDINGS: The liver is normal in size and texture without focal mass.  There is no intra or extrahepatic biliary dilatation. The common  hepatic duct  measures 0.3 cm.  The gallbladder is normal in appearance  without gallstones.  The pancreas head and body appear normal. The  tail is obscured by bowel gas.  The right kidney measures 12.6 cm.  There is heterogeneous appearance of the right kidney with an  ill-defined mass or lobulation in the midkidney measuring  approximately 4.1 x 2.0 x 2.0 cm. No hydronephrosis.      Impression    IMPRESSION:  1. Heterogeneous appearance of the mid right kidney. This could be a  mass or prominent cortical lobule. No hydronephrosis.  2. No gallstone or biliary dilatation.    CORNELIUS GRANDE MD

## 2017-04-05 NOTE — PLAN OF CARE
Problem: Discharge Planning  Goal: Discharge Planning (Adult, OB, Behavioral, Peds)  Outcome: No Change  PRIOR TO DISCHARGE     Comments: List all goals to be met before discharge home:   Tolerating po: goal met  pain controlled goal met  vitals stable; met

## 2017-04-05 NOTE — PLAN OF CARE
Problem: Discharge Planning  Goal: Discharge Planning (Adult, OB, Behavioral, Peds)  Outcome: No Change  PRIOR TO DISCHARGE     Comments: List all goals to be met before discharge home:   Tolerating po: goal met  pain controlled goal met  vitals stable; partially met. Low grade fever 99.1

## 2017-04-05 NOTE — H&P
DATE OF ADMISSION:  04/04/2017      PRIMARY CARE PHYSICIAN:  Dr. Polo Jo at [facility]      CODE STATUS:  Full code.      CHIEF COMPLAINT:  Ongoing flank pain.      HISTORY OF PRESENT ILLNESS:  Ms. Mary Anne Tavarez is a 52-year-old female with a past medical history significant for GERD who presents to the Emergency Department today with ongoing flank discomfort after being discharged from the ED earlier today with a diagnosis of pyelonephritis.  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  She had been having some diarrhea as well.  She presented to the Emergency Department late last evening and had a UA that was abnormal and a CT of the abdomen and pelvis which showed evidence of right-sided pyelonephritis.  She was doing fairly well at that point and was discharged to home with a prescription for oral ciprofloxacin after receiving a dose of p.o. ciprofloxacin in the Emergency Department last evening.  The patient took 1 dose of ciprofloxacin this morning, which she kept down without issues.  She, however, had intermittent spikes of fever and chills.  This would exacerbate her discomfort in the right flank.  She tried taking ibuprofen at home without significant improvement.  Because of ongoing discomfort, she presents back to the Emergency Department today.      Here in the Emergency Department, the patient did have a repeat UA, which showed some improvement.  She has been given a dose of ceftriazone as well as some morphine with improvement in her symptoms.  Lactic acid is unremarkable and white blood cell count is unchanged.  At this point, given the patient's ongoing discomfort, observation is requested.      PAST MEDICAL HISTORY:  GERD.      MEDICATIONS:   1.  Omeprazole.   2.  Ciprofloxacin.   3.  Ibuprofen.   4.  Calcium plus D.   5.  Naproxen.   6.   Fluorouracil cream.      SOCIAL HISTORY:  The patient denies any use of tobacco and drinks alcohol infrequently.  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      FAMILY HISTORY:  Both mother and uncle had idiopathic pulmonary fibrosis and mom also had rheumatoid arthritis.      ALLERGIES:  No known drug allergies.      REVIEW OF SYSTEMS:  The complete review of systems reviewed and negative, save for the pertinent positives recorded in the HPI.      PHYSICAL EXAMINATION:   VITAL SIGNS:  Show blood pressure of 110/42, heart rate 89, respirations 18, satting 95% on room air, temperature 100.5 degrees Fahrenheit.   GENERAL:  The patient is lying in bed and appears quite comfortable.   HEENT:  Pupils equal, round, reactive to light, extraocular muscle function intact.  No scleral icterus.  Oropharynx is clear.   NECK:  No lymphadenopathy or thyromegaly.   CARDIOVASCULAR:  Heart is regular rate and rhythm without any murmur, rub or gallop.   PULMONARY:  Lungs are clear to auscultation bilaterally without wheeze or rhonchi.   GASTROINTESTINAL:  Positive bowel sounds.  Soft with some tenderness in the right lower quadrant as well as the right costovertebral angle.   SKIN:  No rash or lesion.   LYMPHATICS:  No peripheral edema.   PSYCHIATRIC:  Alert and oriented x3, normal affect.   NEUROLOGIC:  Cranial nerves 2 through 12 are grossly intact without any focal deficits.      LABORATORY DATA:  WBC count 12.0, hemoglobin 10.9, and platelets of 262.  BMP is unremarkable.  Lactic acid is 0.6.  UA now shows 10 WBCs and a few bacteria, which is improved from yesterday showing 25-50 WBCs and moderate bacteria.      Abdominal ultrasound from earlier this morning shows heterogeneous appearance of the mid right kidney.  This could be a mass or prominent cortical lobule.  No evidence of hydronephrosis.      Abdominal CT from last evening shows heterogeneous enhancement in the mid and lower pole of the right kidney with mild surrounding  inflammation.  This may be acute pyelonephritis.  Subtle renal mass is not completely ruled out and followup is recommended.      ASSESSMENT AND PLAN:  Ms. Olson is a 52-year-old female with a past medical history significant for gastroesophageal reflux disease who presents to the Emergency Department with ongoing flank pain and diagnosis of pyelonephritis.   1.  Acute right-sided pyelonephritis:  The patient is getting a dose of IV ceftriaxone here and I will continue with p.o. ciprofloxacin beginning this evening.  Await urine cultures, which are currently pending.  I would continue with IV fluids, p.r.n. ibuprofen and p.r.n. Percocet.   2.  Gastroesophageal reflux disease:  Can resume PPI upon discharge.   3.  Heterogeneous right kidney:  I suspect this is all due to pyelonephritis and this should completely resolve with treatment.  I have told the patient she should follow up with her PCP and consider additional imaging once her pyelonephritis resolves to ensure that there is no evidence of a mass.   4.  Disposition:  She is brought under observation status.  The patient's pain is a little better controlled and she can tolerate p.o.  I anticipate her discharge tomorrow morning.         KENNETH CARRERA DO             D: 2017 19:34   T: 2017 20:21   MT: ROYCE      Name:     GIGI OLSON   MRN:      [id number]        Account:      [id number]   :      1965           Admitted:     [id number]      Document: [id number]

## 2017-04-05 NOTE — PLAN OF CARE
Problem: Goal Outcome Summary  Goal: Goal Outcome Summary  Outcome: Improving  Afebrile. VSS. Taking Percocet for pain control. IVF's infusing. Voiding and ambulating with SBA

## 2017-04-05 NOTE — ED NOTES
"[facility]  ED Nurse Handoff Report    ED Chief complaint: Chills (Was her last night and dx with kidney infection.  Not better today.  ) and Fever      ED Diagnosis:   Final diagnoses:   Acute pyelonephritis       Code Status: Full Code    Allergies: No Known Allergies    Activity level - Baseline/Home:  Independent    Activity Level - Current:   Independent     Needed?: No    Isolation: No  Infection: Not Applicable    Bariatric?: No    Vital Signs:   Vitals:    04/04/17 1727   BP: 110/42   Pulse: 89   Resp: 18   Temp: 100.5  F (38.1  C)   TempSrc: Temporal   SpO2: 95%   Weight: 82.6 kg (182 lb)   Height: 1.6 m (5' 3\")       Cardiac Rhythm: ,        Pain level: 0-10 Pain Scale: 6    Is this patient confused?: No    Patient Report: Initial Complaint: Right flank pain.   Focused Assessment: Seen yesterday for the same. Pt has taken 2 doses of her antibiotic and was not feeling better. Returned to ED. Pt alert/oriented/fully independent.  Tests Performed: see epic  Abnormal Results: see epic  Treatments provided: see [facility]    Family Comments: family at bedside    OBS brochure/video discussed/provided to patient: Yes    ED Medications:   Medications   0.9% sodium chloride BOLUS (1,000 mLs Intravenous New Bag 4/4/17 1821)     Followed by   0.9% sodium chloride infusion (not administered)   sodium chloride (PF) 0.9% PF flush 10 mL (not administered)   cefTRIAXone (ROCEPHIN) 1 g vial to attach to  mL bag for ADULTS or NS 50 mL bag for PEDS (1 g Intravenous New Bag 4/4/17 1921)   morphine (PF) injection 4 mg (4 mg Intravenous Given 4/4/17 1921)   acetaminophen (TYLENOL) tablet 650 mg (650 mg Oral Given 4/4/17 1920)       Drips infusing?:  Yes      ED NURSE PHONE NUMBER: [phone]         "

## 2017-04-05 NOTE — PLAN OF CARE
Problem: Discharge Planning  Goal: Discharge Planning (Adult, OB, Behavioral, Peds)  Outcome: No Change  PRIOR TO DISCHARGE     Comments: List all goals to be met before discharge home:   Tolerating po, making progress  pain controlled, making progress  vitals stable goal met

## 2017-04-05 NOTE — PLAN OF CARE
Problem: Goal Outcome Summary  Goal: Goal Outcome Summary  Outcome: Adequate for Discharge Date Met:  04/05/17  Pt doing well. A/o. VSS. Pain controlled. Tolerating reg diet. Up adlib. Ok to dc per MD. Dc instructions reviewed with pt. Verbalizes understanding. PIV removed. Take home meds given. Copy signed. Follow up aware. Verified 5 medication rights. Dc home with family.

## 2017-04-05 NOTE — PLAN OF CARE
Problem: Discharge Planning  Goal: Discharge Planning (Adult, OB, Behavioral, Peds)  Outcome: No Change  PRIOR TO DISCHARGE     Comments: List all goals to be met before discharge home:   Tolerating po: goal met  pain controlled goal met  vitals stable goal met

## 2017-04-06 LAB
BACTERIA SPEC CULT: ABNORMAL
BACTERIA SPEC CULT: ABNORMAL
Lab: ABNORMAL
Lab: ABNORMAL
MICRO REPORT STATUS: ABNORMAL
MICRO REPORT STATUS: ABNORMAL
MICROORGANISM SPEC CULT: ABNORMAL
MICROORGANISM SPEC CULT: ABNORMAL
SPECIMEN SOURCE: ABNORMAL
SPECIMEN SOURCE: ABNORMAL

## 2017-08-27 ENCOUNTER — RADIANT APPOINTMENT (OUTPATIENT)
Dept: GENERAL RADIOLOGY | Facility: CLINIC | Age: 52
End: 2017-08-27
Attending: FAMILY MEDICINE
Payer: COMMERCIAL

## 2017-08-27 ENCOUNTER — OFFICE VISIT (OUTPATIENT)
Dept: URGENT CARE | Facility: URGENT CARE | Age: 52
End: 2017-08-27
Payer: COMMERCIAL

## 2017-08-27 VITALS
WEIGHT: 191.8 LBS | DIASTOLIC BLOOD PRESSURE: 80 MMHG | OXYGEN SATURATION: 96 % | SYSTOLIC BLOOD PRESSURE: 110 MMHG | TEMPERATURE: 98.6 F | BODY MASS INDEX: 33.98 KG/M2 | HEART RATE: 72 BPM

## 2017-08-27 DIAGNOSIS — S60.222A CONTUSION OF LEFT HAND, INITIAL ENCOUNTER: ICD-10-CM

## 2017-08-27 DIAGNOSIS — S60.222A CONTUSION OF LEFT HAND, INITIAL ENCOUNTER: Primary | ICD-10-CM

## 2017-08-27 PROCEDURE — 99213 OFFICE O/P EST LOW 20 MIN: CPT | Performed by: FAMILY MEDICINE

## 2017-08-27 PROCEDURE — 73130 X-RAY EXAM OF HAND: CPT | Mod: LT

## 2017-08-27 NOTE — NURSING NOTE
"Chief Complaint   Patient presents with     Urgent Care     pt states left hand injury from ladder fall 1x hr        Initial /80 (BP Location: Left arm, Patient Position: Chair, Cuff Size: Adult Regular)  Pulse 72  Temp 98.6  F (37  C) (Oral)  Wt 191 lb 12.8 oz (87 kg)  SpO2 96%  BMI 33.98 kg/m2 Estimated body mass index is 33.98 kg/(m^2) as calculated from the following:    Height as of 4/4/17: 5' 2.99\" (1.6 m).    Weight as of this encounter: 191 lb 12.8 oz (87 kg).  Medication Reconciliation: complete    "

## 2017-08-27 NOTE — PROGRESS NOTES
SUBJECTIVE:  Chief Complaint   Patient presents with     Urgent Care     pt states left hand injury from ladder fall 1x hr      Mary Anne Tavarez is a 52 year old female who sustained a left hand injury 1 hours ago. Mechanism of injury: fell off bottom rung of a ladder, fell onto left hand. Immediate symptoms: immediate pain, immediate swelling, was able to bear weight directly after injury, was able to use arm directly after injury, was able to use hand directly after injury, but pain, especially with moving the 5th finger with pain in the palm, no deformity was noted by the patient. Symptoms have been sudden, unchanged since that time. Prior history of related problems: no prior problems with this area in the past.    No past medical history on file.     ALLERGIES:  Review of patient's allergies indicates no known allergies.        Current Outpatient Prescriptions on File Prior to Visit:  OMEPRAZOLE PO Take 20 mg by mouth   oxyCODONE-acetaminophen (PERCOCET) 5-325 MG per tablet Take 1 tablet by mouth every 4 hours as needed for moderate to severe pain   IBUPROFEN PO Take 600 mg by mouth every 4 hours as needed for moderate pain   Calcium Citrate-Vitamin D (CALCIUM + D PO) Take 1 tablet by mouth 2 times daily   fluorouracil (EFUDEX) 5 % cream Apply topically once a week   Naproxen Sodium (ALEVE PO) Take 1 tablet by mouth 2 times daily as needed      No current facility-administered medications on file prior to visit.     Social History   Substance Use Topics     Smoking status: Former Smoker     Smokeless tobacco: Not on file     Alcohol use Yes       No family history on file.      ROS:  CONSTITUTIONAL:NEGATIVE for fever, chills, change in weight  INTEGUMENTARY/SKIN: NEGATIVE for worrisome rashes, moles or lesions  EYES: NEGATIVE for vision changes or irritation  ENT/MOUTH: NEGATIVE for ear, mouth and throat problems  RESP:NEGATIVE for significant cough or SOB  GI: NEGATIVE for nausea, abdominal pain, heartburn, or  change in bowel habits    OBJECTIVE:  Vital signs: /80 (BP Location: Left arm, Patient Position: Chair, Cuff Size: Adult Regular)  Pulse 72  Temp 98.6  F (37  C) (Oral)  Wt 191 lb 12.8 oz (87 kg)  SpO2 96%  BMI 33.98 kg/m2  Appearance: alert and cooperative..  Pain with moving left hand  No pain, contusion, swelling or limited ROM of the left elbow, shoulder, wrist    left Hand exam:  sensation intact all fingers  ROM intact all fingers, though pain in the region of the  palm with ROM of the 5th finger(s)  Purple contusion 2 x 5 cm in palm of hand  Greatest pain with palpation of proximal 5th metacarpel,  No pain with palpation of fingers , other metacarpels, thumb or wrist bones  Capillary refill 2 seconds all fingers     no Injury to the skin of the hand        X-ray: no fracture or dislocation noted, pending review by Radiologist.    ASSESSMENT:  Contusion of left hand, initial encounter      - XR Hand Left G/E 3 Views; Future      cool packs to reduce swelling , then later warm packs to the painful region to encourage blood flow     Ibuprofen/ acetaminophen as and alternative for pain     Follow-up with primary care or ortho hand if persistent symptoms    Ace wrap to hand

## 2017-08-27 NOTE — MR AVS SNAPSHOT
"              After Visit Summary   2017    Mary Anne Tavarez    MRN: [id number]           Patient Information     Date Of Birth          1965        Visit Information        Provider Department      2017 5:45 PM Madeline Persaud MD [facility]        Today's Diagnoses     Contusion of left hand, initial encounter    -  1       Follow-ups after your visit        Who to contact     If you have questions or need follow up information about today's clinic visit or your schedule please contact [facility] directly at [phone].  Normal or non-critical lab and imaging results will be communicated to you by Buzzvilhart, letter or phone within 4 business days after the clinic has received the results. If you do not hear from us within 7 days, please contact the clinic through Buzzvilhart or phone. If you have a critical or abnormal lab result, we will notify you by phone as soon as possible.  Submit refill requests through KeepRecipes or call your pharmacy and they will forward the refill request to us. Please allow 3 business days for your refill to be completed.          Additional Information About Your Visit        MyChart Information     KeepRecipes lets you send messages to your doctor, view your test results, renew your prescriptions, schedule appointments and more. To sign up, go to [email or web address] . Click on \"Log in\" on the left side of the screen, which will take you to the Welcome page. Then click on \"Sign up Now\" on the right side of the page.     You will be asked to enter the access code listed below, as well as some personal information. Please follow the directions to create your username and password.     Your access code is: [email or web address]  Expires: 2017  6:58 PM     Your access code will  in 90 days. If you need help or a new code, please call your [place] clinic or [phone].        Care EveryWhere ID     This is your Care " EveryWhere ID. This could be used by other organizations to access your [facility] medical records  [id number]        Your Vitals Were     Pulse Temperature Pulse Oximetry BMI (Body Mass Index)          72 98.6  F (37  C) (Oral) 96% 33.98 kg/m2         Blood Pressure from Last 3 Encounters:   08/27/17 110/80   04/05/17 103/44   04/04/17 107/53    Weight from Last 3 Encounters:   08/27/17 191 lb 12.8 oz (87 kg)   04/04/17 182 lb (82.6 kg)   04/03/17 182 lb (82.6 kg)               Primary Care Provider    None       No address on file        Equal Access to Services     HIRAL JIN : Hadii jackson Holcomb, wastu gaines, sriram kaalmada silvia, lianne yi. So [facility] [phone].    ATENCIÓN: Si habla español, tiene a zaman disposición servicios gratuitos de asistencia lingüística. Llame al [phone].    We comply with applicable federal civil rights laws and [place] laws. We do not discriminate on the basis of race, color, national origin, age, disability sex, sexual orientation or gender identity.            Thank you!     Thank you for choosing [facility]  for your care. Our goal is always to provide you with excellent care. Hearing back from our patients is one way we can continue to improve our services. Please take a few minutes to complete the written survey that you may receive in the mail after your visit with us. Thank you!             Your Updated Medication List - Protect others around you: Learn how to safely use, store and throw away your medicines at www.disposemymeds.org.          This list is accurate as of: 8/27/17  6:58 PM.  Always use your most recent med list.                   Brand Name Dispense Instructions for use Diagnosis    ALEVE PO      Take 1 tablet by mouth 2 times daily as needed        CALCIUM + D PO      Take 1 tablet by mouth 2 times daily        fluorouracil 5 % cream    EFUDEX     Apply topically once a week         IBUPROFEN PO      Take 600 mg by mouth every 4 hours as needed for moderate pain        OMEPRAZOLE PO      Take 20 mg by mouth        oxyCODONE-acetaminophen 5-325 MG per tablet    PERCOCET    10 tablet    Take 1 tablet by mouth every 4 hours as needed for moderate to severe pain    Acute pyelonephritis

## 2019-12-22 ENCOUNTER — HOSPITAL ENCOUNTER (EMERGENCY)
Facility: CLINIC | Age: 54
Discharge: HOME | End: 2019-12-22
Attending: EMERGENCY MEDICINE | Admitting: EMERGENCY MEDICINE
Payer: COMMERCIAL

## 2019-12-22 ENCOUNTER — APPOINTMENT (OUTPATIENT)
Dept: CT IMAGING | Facility: CLINIC | Age: 54
End: 2019-12-22
Attending: EMERGENCY MEDICINE
Payer: COMMERCIAL

## 2019-12-22 VITALS
SYSTOLIC BLOOD PRESSURE: 132 MMHG | BODY MASS INDEX: 34.02 KG/M2 | OXYGEN SATURATION: 97 % | DIASTOLIC BLOOD PRESSURE: 66 MMHG | HEART RATE: 70 BPM | TEMPERATURE: 97.9 F | HEIGHT: 63 IN | RESPIRATION RATE: 18 BRPM | WEIGHT: 192 LBS

## 2019-12-22 DIAGNOSIS — R10.31 RLQ ABDOMINAL PAIN: ICD-10-CM

## 2019-12-22 DIAGNOSIS — R14.0 ABDOMINAL BLOATING: ICD-10-CM

## 2019-12-22 LAB
ALBUMIN SERPL-MCNC: 3.7 G/DL (ref 3.4–5)
ALBUMIN UR-MCNC: NEGATIVE MG/DL
ALP SERPL-CCNC: 78 U/L (ref 40–150)
ALT SERPL W P-5'-P-CCNC: 33 U/L (ref 0–50)
ANION GAP SERPL CALCULATED.3IONS-SCNC: 5 MMOL/L (ref 3–14)
APPEARANCE UR: CLEAR
AST SERPL W P-5'-P-CCNC: 29 U/L (ref 0–45)
BASOPHILS # BLD AUTO: 0.1 10E9/L (ref 0–0.2)
BASOPHILS NFR BLD AUTO: 0.5 %
BILIRUB SERPL-MCNC: 0.4 MG/DL (ref 0.2–1.3)
BILIRUB UR QL STRIP: NEGATIVE
BUN SERPL-MCNC: 13 MG/DL (ref 7–30)
CALCIUM SERPL-MCNC: 9.2 MG/DL (ref 8.5–10.1)
CHLORIDE SERPL-SCNC: 104 MMOL/L (ref 94–109)
CO2 SERPL-SCNC: 29 MMOL/L (ref 20–32)
COLOR UR AUTO: NORMAL
CREAT SERPL-MCNC: 0.78 MG/DL (ref 0.52–1.04)
DIFFERENTIAL METHOD BLD: NORMAL
EOSINOPHIL # BLD AUTO: 0.5 10E9/L (ref 0–0.7)
EOSINOPHIL NFR BLD AUTO: 4.7 %
ERYTHROCYTE [DISTWIDTH] IN BLOOD BY AUTOMATED COUNT: 13.9 % (ref 10–15)
GFR SERPL CREATININE-BSD FRML MDRD: 85 ML/MIN/{1.73_M2}
GLUCOSE SERPL-MCNC: 89 MG/DL (ref 70–99)
GLUCOSE UR STRIP-MCNC: NEGATIVE MG/DL
HCT VFR BLD AUTO: 40.9 % (ref 35–47)
HGB BLD-MCNC: 13.3 G/DL (ref 11.7–15.7)
HGB UR QL STRIP: NEGATIVE
IMM GRANULOCYTES # BLD: 0 10E9/L (ref 0–0.4)
IMM GRANULOCYTES NFR BLD: 0.3 %
KETONES UR STRIP-MCNC: NEGATIVE MG/DL
LEUKOCYTE ESTERASE UR QL STRIP: NEGATIVE
LIPASE SERPL-CCNC: 96 U/L (ref 73–393)
LYMPHOCYTES # BLD AUTO: 2.9 10E9/L (ref 0.8–5.3)
LYMPHOCYTES NFR BLD AUTO: 29.7 %
MCH RBC QN AUTO: 28.7 PG (ref 26.5–33)
MCHC RBC AUTO-ENTMCNC: 32.5 G/DL (ref 31.5–36.5)
MCV RBC AUTO: 88 FL (ref 78–100)
MONOCYTES # BLD AUTO: 0.9 10E9/L (ref 0–1.3)
MONOCYTES NFR BLD AUTO: 8.8 %
NEUTROPHILS # BLD AUTO: 5.5 10E9/L (ref 1.6–8.3)
NEUTROPHILS NFR BLD AUTO: 56 %
NITRATE UR QL: NEGATIVE
PH UR STRIP: 5.5 PH (ref 5–7)
PLATELET # BLD AUTO: 260 10E9/L (ref 150–450)
POTASSIUM SERPL-SCNC: 3.6 MMOL/L (ref 3.4–5.3)
PROT SERPL-MCNC: 7.7 G/DL (ref 6.8–8.8)
RBC # BLD AUTO: 4.64 10E12/L (ref 3.8–5.2)
SODIUM SERPL-SCNC: 138 MMOL/L (ref 133–144)
SOURCE: NORMAL
SP GR UR STRIP: 1 (ref 1–1.03)
UROBILINOGEN UR STRIP-MCNC: NORMAL MG/DL (ref 0–2)
WBC # BLD AUTO: 9.8 10E9/L (ref 4–11)

## 2019-12-22 PROCEDURE — 80053 COMPREHEN METABOLIC PANEL: CPT | Performed by: EMERGENCY MEDICINE

## 2019-12-22 PROCEDURE — 99285 EMERGENCY DEPT VISIT HI MDM: CPT | Mod: 25

## 2019-12-22 PROCEDURE — 96361 HYDRATE IV INFUSION ADD-ON: CPT

## 2019-12-22 PROCEDURE — 85025 COMPLETE CBC W/AUTO DIFF WBC: CPT | Performed by: EMERGENCY MEDICINE

## 2019-12-22 PROCEDURE — 81003 URINALYSIS AUTO W/O SCOPE: CPT | Performed by: EMERGENCY MEDICINE

## 2019-12-22 PROCEDURE — 25800030 ZZH RX IP 258 OP 636: Performed by: EMERGENCY MEDICINE

## 2019-12-22 PROCEDURE — 74177 CT ABD & PELVIS W/CONTRAST: CPT

## 2019-12-22 PROCEDURE — 25000128 H RX IP 250 OP 636: Performed by: EMERGENCY MEDICINE

## 2019-12-22 PROCEDURE — 25000125 ZZHC RX 250: Performed by: EMERGENCY MEDICINE

## 2019-12-22 PROCEDURE — 83690 ASSAY OF LIPASE: CPT | Performed by: EMERGENCY MEDICINE

## 2019-12-22 PROCEDURE — 96374 THER/PROPH/DIAG INJ IV PUSH: CPT | Mod: 59

## 2019-12-22 RX ORDER — IBUPROFEN 600 MG/1
600 TABLET, FILM COATED ORAL EVERY 8 HOURS PRN
Qty: 30 TABLET | Refills: 0 | Status: SHIPPED | OUTPATIENT
Start: 2019-12-22 | End: 2020-01-21

## 2019-12-22 RX ORDER — POLYETHYLENE GLYCOL 3350 17 G/17G
POWDER, FOR SOLUTION ORAL
Qty: 527 G | Refills: 0 | Status: SHIPPED | OUTPATIENT
Start: 2019-12-22

## 2019-12-22 RX ORDER — ONDANSETRON 2 MG/ML
4 INJECTION INTRAMUSCULAR; INTRAVENOUS EVERY 30 MIN PRN
Status: DISCONTINUED | OUTPATIENT
Start: 2019-12-22 | End: 2019-12-22 | Stop reason: HOSPADM

## 2019-12-22 RX ORDER — IOPAMIDOL 755 MG/ML
96 INJECTION, SOLUTION INTRAVASCULAR ONCE
Status: COMPLETED | OUTPATIENT
Start: 2019-12-22 | End: 2019-12-22

## 2019-12-22 RX ADMIN — SODIUM CHLORIDE 69 ML: 9 INJECTION, SOLUTION INTRAVENOUS at 18:53

## 2019-12-22 RX ADMIN — SODIUM CHLORIDE 1000 ML: 9 INJECTION, SOLUTION INTRAVENOUS at 18:06

## 2019-12-22 RX ADMIN — IOPAMIDOL 96 ML: 755 INJECTION, SOLUTION INTRAVENOUS at 18:53

## 2019-12-22 RX ADMIN — ONDANSETRON 4 MG: 2 INJECTION INTRAMUSCULAR; INTRAVENOUS at 18:05

## 2019-12-22 ASSESSMENT — ENCOUNTER SYMPTOMS
DYSURIA: 0
NAUSEA: 1
ABDOMINAL PAIN: 1
APPETITE CHANGE: 1
FEVER: 0
HEMATURIA: 0
VOMITING: 0
ABDOMINAL DISTENTION: 1
DIARRHEA: 1

## 2019-12-22 ASSESSMENT — MIFFLIN-ST. JEOR: SCORE: 1440.04

## 2019-12-22 NOTE — ED AVS SNAPSHOT
Emergency Department  [address] [place] [address]  Phone:  [phone]  Fax:  [phone]                                    Mary Anne Tavarez   MRN: [id number]    Department:   Emergency Department   Date of Visit:  12/22/2019           After Visit Summary Signature Page    I have received my discharge instructions, and my questions have been answered. I have discussed any challenges I see with this plan with the nurse or doctor.    ..........................................................................................................................................  Patient/Patient Representative Signature      ..........................................................................................................................................  Patient Representative Print Name and Relationship to Patient    ..................................................               ................................................  Date                                   Time    ..........................................................................................................................................  Reviewed by Signature/Title    ...................................................              ..............................................  Date                                               Time          22EPIC Rev 08/18

## 2019-12-22 NOTE — ED NOTES
Bed: FT02  Expected date:   Expected time:   Means of arrival:   Comments:  Triage   Body fluid exposure coming

## 2019-12-22 NOTE — ED PROVIDER NOTES
History   Chief Complaint:  Abdominal Pain    HPI   Mary Anne Tavarez is a 54 year old female, with a history of pyelonephritis, who presents to the ED for evaluation of abdominal pain. The patient reports having a distended and swollen abdomen beginning yesterday, with some diarrhea beginning today. The patient states she does have some GI problems, but this is more than normal.  She states that she is regularly constipated and can go anywhere from 2 to 5 days without having a bowel movement.  She also notes that she frequently gets bloated and abdominal cramping related to this.  She states she took two charcoal pills and drank a kombucha, but had not alleviated lower abdominal pain. When she lays down the pain is less severe, but when she sits up the pain is more intense. The patient notes the pain has been relatively constant and not worsening. She does endorse some nausea, but she reports she has not eaten a lot today. She denies any fever, vomiting, dysuria, hematuria, vaginal bleeding, or any other acute symptoms.      Allergies:  No known drug allergies    Medications:    Omeprazole  Epi pen    Past Medical History:    Pyelonephritis    Past Surgical History:    Breast augmentation   surgery  Skin malignant neoplasm    Family History:    Cervix biopsy   Breast reconstruction  Tubal ligation  D&C   Tonsillectomy   [place] teeth extraction   Malignant skin lesion excision     Social History:  Smoking status: former  Alcohol use: yes   Drug use: no   PCP: [facility]  Marital Status:  Single      Review of Systems   Constitutional: Positive for appetite change. Negative for fever.   Gastrointestinal: Positive for abdominal distention, abdominal pain, diarrhea and nausea. Negative for vomiting.   Genitourinary: Negative for dysuria, hematuria and vaginal bleeding.   All other systems reviewed and are negative.    Physical Exam     Patient Vitals for the past 24 hrs:   BP Temp Temp src Pulse  "Resp SpO2 Height Weight   12/22/19 1701 132/66 97.9  F (36.6  C) Temporal 71 18 96 % 1.6 m (5' 3\") 87.1 kg (192 lb)      Physical Exam  General: Well appearing, nontoxic.  Resting comfortably  Head:  Scalp, face, and head appear normal  Eyes:  Pupils are equal, round, and reactive to light    Conjunctivae non-injected and sclerae white  ENT:    The external nose is normal    Pinnae are normal    The oropharynx is normal, mucous membranes moist    Uvula is in the midline  Neck:  Normal range of motion    There is no rigidity noted    Trachea is in the midline  CV:  Regular rate and rhythm     Normal S1/S2, no S3/S4    No murmur or rub  Resp:  Lungs are clear and equal bilaterally    There is no tachypnea    No increased work of breathing    No rales, wheezing, or rhonchi  GI:  Abdomen is soft, no rigidity or guarding    No distension, or mass    Mild rlq and lower abdominal tenderness. No rebound tenderness   MS:  Normal muscular tone    Symmetric motor strength    No lower extremity edema  Skin:  No rash or acute skin lesions noted  Neuro:  Awake and alert    Speech is normal and fluent    Moves all extremities spontaneously  Psych: Normal affect.  Appropriate interactions.     Emergency Department Course   Imaging:  Radiographic findings were communicated with the patient who voiced understanding of the findings.    CT Abd/pelvis without contrast:  No acute abnormality is seen. Normal appendix.     Imaging independently reviewed and agree with radiologist interpretation.     Laboratory:  CBC: WNL (WBC 9.8, HGB 13.3, )    CMP: (Creatinine 0.78)    UA: Negative    Lipase: 96    Interventions:  1805: NS 1L IV Bolus   1806: Zofran 4 mg IV    Emergency Department Course:  Past medical records, nursing notes, and vitals reviewed.  1709: I performed an exam of the patient and obtained history, as documented above.  IV inserted and blood drawn.  The patient was sent for an abdominal CT while in the emergency " department, findings above.    1920: I rechecked the patient. Explained findings to patient.    Findings and plan explained to the Patient. Patient discharged home with instructions regarding supportive care, medications, and reasons to return. The importance of close follow-up was reviewed.     Impression & Plan    Medical Decision Making:  Mary Anne Tavarez is a 54 year old female who presents with abdominal pain.  She looks overall well and without a concerning etiology of abdominal pain. Abdominal exam is benign without evidence of peritonitis or acute surgical emergency.  A broad differential diagnosis was of course considered. The workup in the ED is at this point negative.  No etiology for the patients pain is found at this point and my suspicion of an intraabdominal catastrophe or other worrisome etiology is very low. CT and lab work are reassuring. Patient is hemodynamically stable in ED. patient does have issues with chronic constipation and on review of the CT images she does have a moderate amount of stool burden in the colon as well as gas which may be causing intestinal cramping.  I recommended daily MiraLAX titrated from 1-3 doses daily to attempt to achieve regular normal bowel movements as well as simethicone as needed to help with bloating and gas.  I recommended supportive care at home with close follow-up with primary care physician if symptoms continue or do not improve.  Close return precautions were provided the patient was agreeable to plan of care and she was discharged in stable condition.    Diagnosis:    ICD-10-CM    1. RLQ abdominal pain R10.31    2. Abdominal bloating R14.0      Disposition:  Discharged to home.    Discharge Medications:  New Prescriptions    IBUPROFEN (ADVIL/MOTRIN) 600 MG TABLET    Take 1 tablet (600 mg) by mouth every 8 hours as needed for pain    POLYETHYLENE GLYCOL (MIRALAX) POWDER    Take 1 capful by mouth 1 to 3 times daily as needed for constipation. Decrease dose  or stop taking if > 3 bowel movements per day.    SIMETHICONE 125 MG TABS    Take 1 tablet by mouth 4 times daily as needed (bloating or abdominal cramping)     Eldon Uriostegui  12/22/2019    EMERGENCY DEPARTMENT  Scribe Disclosure:  I, Eldon Uriostegui, am serving as a scribe at 5:09 PM on 12/22/2019 to document services personally performed by Tera Hung MD based on my observations and the provider's statements to me.     Tera Hung MD  12/23/19 0501

## 2019-12-22 NOTE — ED TRIAGE NOTES
Pt feels very bloated and has been having loose stools lately. Lower right pain . No emesis , no fevers.

## 2020-11-29 ENCOUNTER — OFFICE VISIT (OUTPATIENT)
Dept: URGENT CARE | Facility: URGENT CARE | Age: 55
End: 2020-11-29
Payer: COMMERCIAL

## 2020-11-29 VITALS
OXYGEN SATURATION: 96 % | TEMPERATURE: 97.4 F | BODY MASS INDEX: 35.29 KG/M2 | HEART RATE: 80 BPM | RESPIRATION RATE: 16 BRPM | DIASTOLIC BLOOD PRESSURE: 71 MMHG | SYSTOLIC BLOOD PRESSURE: 112 MMHG | WEIGHT: 199.2 LBS

## 2020-11-29 DIAGNOSIS — N30.00 ACUTE CYSTITIS WITHOUT HEMATURIA: Primary | ICD-10-CM

## 2020-11-29 DIAGNOSIS — R82.90 NONSPECIFIC FINDING ON EXAMINATION OF URINE: ICD-10-CM

## 2020-11-29 DIAGNOSIS — R30.0 DYSURIA: ICD-10-CM

## 2020-11-29 LAB
ALBUMIN UR-MCNC: NEGATIVE MG/DL
APPEARANCE UR: CLEAR
BACTERIA #/AREA URNS HPF: ABNORMAL /HPF
BILIRUB UR QL STRIP: NEGATIVE
COLOR UR AUTO: YELLOW
GLUCOSE UR STRIP-MCNC: NEGATIVE MG/DL
HGB UR QL STRIP: ABNORMAL
KETONES UR STRIP-MCNC: NEGATIVE MG/DL
LEUKOCYTE ESTERASE UR QL STRIP: ABNORMAL
NITRATE UR QL: NEGATIVE
PH UR STRIP: 6.5 PH (ref 5–7)
RBC #/AREA URNS AUTO: ABNORMAL /HPF
SOURCE: ABNORMAL
SP GR UR STRIP: 1.01 (ref 1–1.03)
UROBILINOGEN UR STRIP-ACNC: 0.2 EU/DL (ref 0.2–1)
WBC #/AREA URNS AUTO: ABNORMAL /HPF

## 2020-11-29 PROCEDURE — 99203 OFFICE O/P NEW LOW 30 MIN: CPT | Performed by: FAMILY MEDICINE

## 2020-11-29 PROCEDURE — 81001 URINALYSIS AUTO W/SCOPE: CPT | Performed by: INTERNAL MEDICINE

## 2020-11-29 PROCEDURE — 87086 URINE CULTURE/COLONY COUNT: CPT | Performed by: FAMILY MEDICINE

## 2020-11-29 RX ORDER — SULFAMETHOXAZOLE/TRIMETHOPRIM 800-160 MG
1 TABLET ORAL 2 TIMES DAILY
Qty: 10 TABLET | Refills: 0 | Status: SHIPPED | OUTPATIENT
Start: 2020-11-29 | End: 2020-12-04

## 2020-11-29 NOTE — PATIENT INSTRUCTIONS
Take medication Bactrim twice a day for 5 days (ok to discontinue if all symptoms resolve after 3rd dose)     Symptoms should improve within the next 2-3 days. If no improvement, call clinic to discuss or return for re-evaluation    Drink approximately 60-80 ounces of water a day to stay hydrated.     If you develop flank pain, fevers, nausea/vomiting call immediately for assistance

## 2020-11-29 NOTE — PROGRESS NOTES
Subjective:   Mary Anne Tavarez is a 55 year old female who presents for   Chief Complaint   Patient presents with     UTI     56 yo F presents with body aches, burning during urination, dysuria  onset Friday tx- azo, cranberry juice, tylenol  pt works @ [employer] [facility] [place]     Symptoms consist of: the above - this started about 2 nights ago. She is absent of fever/ flank pain      Patient drinks approximately > 80 ounces of fluid a day.     Last UTI/bladder infection was about a year ago   No episodes of UTI in the last 12 months.     Hx of kidney infection about 2.5 years ago which manifested with flank discomfort    Without flank discomfort, new fever, nausea/vomiting. '    -- She is absent of cough. No known exposures to COVID      Patient Active Problem List    Diagnosis Date Noted     Pyelonephritis 04/04/2017     Priority: Medium     Current Outpatient Medications   Medication     Calcium Citrate-Vitamin D (CALCIUM + D PO)     fluorouracil (EFUDEX) 5 % cream     Naproxen Sodium (ALEVE PO)     OMEPRAZOLE PO     polyethylene glycol (MIRALAX) powder     sulfamethoxazole-trimethoprim (BACTRIM DS) 800-160 MG tablet     oxyCODONE-acetaminophen (PERCOCET) 5-325 MG per tablet     Simethicone 125 MG TABS     No current facility-administered medications for this visit.      ROS:  As above per HPI    Objective:   /71   Pulse 80   Temp 97.4  F (36.3  C) (Tympanic)   Resp 16   Wt 90.4 kg (199 lb 3.2 oz)   SpO2 96%   BMI 35.29 kg/m  , Body mass index is 35.29 kg/m .  Gen:  NAD, well-nourished, sitting in chair comfortably  HEENT: EOMI, sclera anicteric, Head normocephalic, ; nares patent; moist mucous membranes  Neck: trachea midline, no thyromegaly  CV:  Hemodynamically stable  Pulm:  no increased work of breathing   Extrem: no cyanosis, edema or clubbing  Skin: no obvious rashes or abnormalities  Psych: Euthymic, linear thoughts, normal rate of speech    Results for orders placed or performed in visit on 11/29/20   *UA reflex  to Microscopic and Culture ([facility] (except [place] and [place])     Status: Abnormal    Specimen: Midstream Urine   Result Value Ref Range    Color Urine Yellow     Appearance Urine Clear     Glucose Urine Negative NEG^Negative mg/dL    Bilirubin Urine Negative NEG^Negative    Ketones Urine Negative NEG^Negative mg/dL    Specific Gravity Urine 1.015 1.003 - 1.035    Blood Urine Trace (A) NEG^Negative    pH Urine 6.5 5.0 - 7.0 pH    Protein Albumin Urine Negative NEG^Negative mg/dL    Urobilinogen Urine 0.2 0.2 - 1.0 EU/dL    Nitrite Urine Negative NEG^Negative    Leukocyte Esterase Urine Small (A) NEG^Negative    Source Midstream Urine    Urine Microscopic     Status: Abnormal   Result Value Ref Range    WBC Urine 25-50 (A) OTO5^0 - 5 /HPF    RBC Urine O - 2 OTO2^O - 2 /HPF    Bacteria Urine Few (A) NEG^Negative /HPF       Assessment & Plan:   Mary Anne Tavarez, 55 year old female who presents with:    Acute cystitis without hematuria  - sulfamethoxazole-trimethoprim (BACTRIM DS) 800-160 MG tablet  Dispense: 10 tablet; Refill: 0  - *UA reflex to Microscopic and Culture ([facility] (except [place] and [place])  - Urine Microscopic  Nonspecific finding on examination of urine  - Urine Culture Aerobic Bacterial    Urine culture is pending. Bactrim for 5 days given if symptoms resolve after 3rd dose ok to discontinue medication. Good oral hydration encouraged. Normal vitals.       Body aches  Patient without fever/cough or known respiratory symptoms and reported no positive contacts to COVID . She was advised to go to [email or web address] if she develops any symptoms of COVID-19. Adherence to strict social distancing advised.       Damion De La Vega MD   [facility]    The use of [employer] dictation services may have been used to construct the content in this note; any grammatical or spelling errors are non-intentional. Please contact the author of this note directly if you  are in need of any clarification.

## 2020-11-30 LAB
BACTERIA SPEC CULT: NORMAL
BACTERIA SPEC CULT: NORMAL
Lab: NORMAL
SPECIMEN SOURCE: NORMAL